# Patient Record
Sex: FEMALE | Race: WHITE | NOT HISPANIC OR LATINO | Employment: OTHER | URBAN - METROPOLITAN AREA
[De-identification: names, ages, dates, MRNs, and addresses within clinical notes are randomized per-mention and may not be internally consistent; named-entity substitution may affect disease eponyms.]

---

## 2017-02-20 RX ORDER — FLUOXETINE HYDROCHLORIDE 20 MG/1
CAPSULE ORAL
Qty: 90 CAPSULE | Refills: 1 | Status: SHIPPED | OUTPATIENT
Start: 2017-02-20 | End: 2017-04-26

## 2017-03-14 ENCOUNTER — OFFICE VISIT (OUTPATIENT)
Dept: INTERNAL MEDICINE | Facility: CLINIC | Age: 67
End: 2017-03-14

## 2017-03-14 VITALS
DIASTOLIC BLOOD PRESSURE: 82 MMHG | OXYGEN SATURATION: 98 % | HEART RATE: 67 BPM | SYSTOLIC BLOOD PRESSURE: 130 MMHG | HEIGHT: 65 IN | WEIGHT: 148 LBS | BODY MASS INDEX: 24.66 KG/M2

## 2017-03-14 DIAGNOSIS — E03.8 OTHER SPECIFIED HYPOTHYROIDISM: ICD-10-CM

## 2017-03-14 DIAGNOSIS — R11.0 NAUSEA: ICD-10-CM

## 2017-03-14 DIAGNOSIS — F39 MOOD DISORDER (HCC): ICD-10-CM

## 2017-03-14 DIAGNOSIS — E78.2 MIXED HYPERLIPIDEMIA: Primary | ICD-10-CM

## 2017-03-14 DIAGNOSIS — R42 DIZZINESS: ICD-10-CM

## 2017-03-14 PROCEDURE — 99214 OFFICE O/P EST MOD 30 MIN: CPT | Performed by: INTERNAL MEDICINE

## 2017-03-14 RX ORDER — ONDANSETRON 4 MG/1
4 TABLET, FILM COATED ORAL EVERY 8 HOURS PRN
Qty: 30 TABLET | Refills: 0 | Status: SHIPPED | OUTPATIENT
Start: 2017-03-14 | End: 2017-07-24

## 2017-03-14 RX ORDER — BUPROPION HYDROCHLORIDE 300 MG/1
300 TABLET ORAL DAILY
Qty: 30 TABLET | Refills: 3 | Status: SHIPPED | OUTPATIENT
Start: 2017-03-14 | End: 2017-04-26 | Stop reason: SINTOL

## 2017-03-14 RX ORDER — ATORVASTATIN CALCIUM 20 MG/1
20 TABLET, FILM COATED ORAL DAILY
COMMUNITY
End: 2017-07-24

## 2017-03-14 NOTE — PROGRESS NOTES
Subjective   Tammy Workman is a 66 y.o. female.     Hyperlipidemia   This is a chronic problem. Associated symptoms include myalgias ( Has reduced  atorvastatin ti 10 MG & myalgias are better). Pertinent negatives include no chest pain.   Hypertension   Pertinent negatives include no chest pain or palpitations.   Depression Patient is not experiencing: palpitations.         The following portions of the patient's history were reviewed and updated as appropriate: allergies, current medications, past family history, past medical history, past social history, past surgical history and problem list.    Review of Systems   Constitutional:        Generally doing well   HENT: Negative.    Eyes: Negative.    Respiratory: Negative.    Cardiovascular: Negative for chest pain and palpitations.   Genitourinary: Negative.    Musculoskeletal: Positive for arthralgias (Hips ache some) and myalgias ( Has reduced  atorvastatin ti 10 MG & myalgias are better).   Neurological: Negative.    Psychiatric/Behavioral: Positive for dysphoric mood (Has been on Prozac for depression Most of her SX are in the AM Daughter takes Wellbutrin & works well ).       Objective   Physical Exam   Constitutional: She is oriented to person, place, and time. She appears well-developed.   HENT:   Head: Normocephalic.   Eyes: EOM are normal.   Neck: Neck supple.   Cardiovascular: Normal rate, regular rhythm and normal heart sounds.    Repeat 132/80   Pulmonary/Chest: Effort normal and breath sounds normal.   Musculoskeletal: Normal range of motion.   Neurological: She is alert and oriented to person, place, and time.       Assessment/Plan   Tammy was seen today for hyperlipidemia, hypertension and depression.    Diagnoses and all orders for this visit:    Mixed hyperlipidemia    Other specified hypothyroidism    Mood disorder  -     buPROPion XL (WELLBUTRIN XL) 300 MG 24 hr tablet; Take 1 tablet by mouth Daily.    Nausea  -     ondansetron (ZOFRAN) 4 MG  tablet; Take 1 tablet by mouth Every 8 (Eight) Hours As Needed for Nausea or Vomiting.    Dizziness  Comments:  will check carotid...? inner ear  Orders:  -     ondansetron (ZOFRAN) 4 MG tablet; Take 1 tablet by mouth Every 8 (Eight) Hours As Needed for Nausea or Vomiting.

## 2017-03-15 DIAGNOSIS — E03.9 HYPOTHYROIDISM IN ADULT: ICD-10-CM

## 2017-03-15 RX ORDER — LEVOTHYROXINE SODIUM 0.05 MG/1
TABLET ORAL
Qty: 90 TABLET | Refills: 0 | Status: SHIPPED | OUTPATIENT
Start: 2017-03-15 | End: 2017-09-01 | Stop reason: SDUPTHER

## 2017-03-27 ENCOUNTER — OFFICE VISIT (OUTPATIENT)
Dept: INTERNAL MEDICINE | Facility: CLINIC | Age: 67
End: 2017-03-27

## 2017-03-27 VITALS
HEIGHT: 65 IN | HEART RATE: 66 BPM | OXYGEN SATURATION: 98 % | DIASTOLIC BLOOD PRESSURE: 86 MMHG | SYSTOLIC BLOOD PRESSURE: 142 MMHG | WEIGHT: 149 LBS | BODY MASS INDEX: 24.83 KG/M2

## 2017-03-27 DIAGNOSIS — L27.0 DRUG RASH: Primary | ICD-10-CM

## 2017-03-27 PROCEDURE — 99213 OFFICE O/P EST LOW 20 MIN: CPT | Performed by: INTERNAL MEDICINE

## 2017-03-27 RX ORDER — METHYLPREDNISOLONE 4 MG/1
TABLET ORAL
Qty: 21 TABLET | Refills: 0 | Status: SHIPPED | OUTPATIENT
Start: 2017-03-27 | End: 2017-04-26

## 2017-03-27 NOTE — PROGRESS NOTES
Subjective   Tammy Workman is a 66 y.o. female.     Rash   This is a new problem. The current episode started in the past 7 days.        The following portions of the patient's history were reviewed and updated as appropriate: allergies, current medications, past family history, past medical history, past social history, past surgical history and problem list.    Review of Systems   Constitutional:        Has rash   Respiratory: Negative.    Cardiovascular: Negative.    Gastrointestinal: Negative.    Skin: Positive for rash ( started breaking out in Pruritic rash over the weekend  Thinks due to Wellbutrin whiich is new & jusrt started 2 weeks age  Has taken Benadryl).       Objective   Physical Exam   Constitutional: She appears well-developed.   Skin: Rash noted. There is erythema ( Has erythematous papular rash  mainly on torso).   Vitals reviewed.      Assessment/Plan   Tammy was seen today for rash.    Diagnoses and all orders for this visit:    Drug rash  -     MethylPREDNISolone (MEDROL) 4 MG tablet; follow package directions

## 2017-04-26 ENCOUNTER — OFFICE VISIT (OUTPATIENT)
Dept: INTERNAL MEDICINE | Facility: CLINIC | Age: 67
End: 2017-04-26

## 2017-04-26 VITALS
DIASTOLIC BLOOD PRESSURE: 96 MMHG | OXYGEN SATURATION: 97 % | BODY MASS INDEX: 24.66 KG/M2 | HEIGHT: 65 IN | WEIGHT: 148 LBS | HEART RATE: 62 BPM | SYSTOLIC BLOOD PRESSURE: 130 MMHG

## 2017-04-26 DIAGNOSIS — E78.2 MIXED HYPERLIPIDEMIA: ICD-10-CM

## 2017-04-26 DIAGNOSIS — F39 MOOD DISORDER (HCC): Primary | ICD-10-CM

## 2017-04-26 DIAGNOSIS — E03.8 OTHER SPECIFIED HYPOTHYROIDISM: ICD-10-CM

## 2017-04-26 DIAGNOSIS — G47.00 INSOMNIA, UNSPECIFIED TYPE: ICD-10-CM

## 2017-04-26 LAB
ALBUMIN SERPL-MCNC: 4.15 G/DL (ref 3.4–4.6)
ALBUMIN/GLOB SERPL: 1.4 G/DL
ALP SERPL-CCNC: 75 U/L (ref 46–116)
ALT SERPL W P-5'-P-CCNC: 28 U/L (ref 14–59)
ANION GAP SERPL CALCULATED.3IONS-SCNC: 10 MMOL/L
AST SERPL-CCNC: 21 U/L (ref 7–37)
BASOPHILS # BLD AUTO: 0.02 10*3/MM3 (ref 0–0.2)
BASOPHILS NFR BLD AUTO: 0.4 % (ref 0–2)
BILIRUB SERPL-MCNC: 0.5 MG/DL (ref 0.2–1)
BUN BLD-MCNC: 14 MG/DL (ref 6–22)
BUN/CREAT SERPL: 13.9 (ref 7–25)
CALCIUM SPEC-SCNC: 9.2 MG/DL (ref 8.6–10.5)
CHLORIDE SERPL-SCNC: 103 MMOL/L (ref 95–107)
CHOLEST SERPL-MCNC: 258 MG/DL (ref 0–200)
CO2 SERPL-SCNC: 28 MMOL/L (ref 23–32)
CREAT BLD-MCNC: 1.01 MG/DL (ref 0.55–1.02)
DEPRECATED RDW RBC AUTO: 43.1 FL (ref 37–54)
EOSINOPHIL # BLD AUTO: 0.1 10*3/MM3 (ref 0–0.7)
EOSINOPHIL NFR BLD AUTO: 2.1 % (ref 0–5)
ERYTHROCYTE [DISTWIDTH] IN BLOOD BY AUTOMATED COUNT: 14 % (ref 11.5–15)
GFR SERPL CREATININE-BSD FRML MDRD: 55 ML/MIN/1.73
GLOBULIN UR ELPH-MCNC: 3.1 GM/DL
GLUCOSE BLD-MCNC: 95 MG/DL (ref 70–100)
HCT VFR BLD AUTO: 42.9 % (ref 34.1–44.9)
HDLC SERPL-MCNC: 65 MG/DL (ref 40–81)
HGB BLD-MCNC: 14.6 G/DL (ref 11.2–15.7)
LDLC SERPL CALC-MCNC: 175 MG/DL (ref 0–100)
LDLC/HDLC SERPL: 2.7 {RATIO}
LYMPHOCYTES # BLD AUTO: 1.9 10*3/MM3 (ref 0.8–7)
LYMPHOCYTES NFR BLD AUTO: 40.5 % (ref 10–60)
MCH RBC QN AUTO: 29.4 PG (ref 26–34)
MCHC RBC AUTO-ENTMCNC: 34 G/DL (ref 31–37)
MCV RBC AUTO: 86.5 FL (ref 80–100)
MONOCYTES # BLD AUTO: 0.44 10*3/MM3 (ref 0–1)
MONOCYTES NFR BLD AUTO: 9.4 % (ref 0–13)
NEUTROPHILS # BLD AUTO: 2.23 10*3/MM3 (ref 1–11)
NEUTROPHILS NFR BLD AUTO: 47.6 % (ref 30–85)
PLATELET # BLD AUTO: 356 10*3/MM3 (ref 150–450)
PMV BLD AUTO: 9.8 FL (ref 6–12)
POTASSIUM BLD-SCNC: 4.7 MMOL/L (ref 3.3–5.3)
PROT SERPL-MCNC: 7.2 G/DL (ref 6.3–8.4)
RBC # BLD AUTO: 4.96 10*6/MM3 (ref 3.93–5.22)
SODIUM BLD-SCNC: 141 MMOL/L (ref 136–145)
T4 FREE SERPL-MCNC: 1.12 NG/DL (ref 0.93–1.7)
TRIGL SERPL-MCNC: 88 MG/DL (ref 0–150)
TSH SERPL DL<=0.05 MIU/L-ACNC: 1.33 MIU/ML (ref 0.4–4.2)
VLDLC SERPL-MCNC: 17.6 MG/DL
WBC NRBC COR # BLD: 4.69 10*3/MM3 (ref 5–10)

## 2017-04-26 PROCEDURE — 85025 COMPLETE CBC W/AUTO DIFF WBC: CPT | Performed by: INTERNAL MEDICINE

## 2017-04-26 PROCEDURE — 80053 COMPREHEN METABOLIC PANEL: CPT | Performed by: INTERNAL MEDICINE

## 2017-04-26 PROCEDURE — 80061 LIPID PANEL: CPT | Performed by: INTERNAL MEDICINE

## 2017-04-26 PROCEDURE — 84443 ASSAY THYROID STIM HORMONE: CPT | Performed by: INTERNAL MEDICINE

## 2017-04-26 PROCEDURE — 99214 OFFICE O/P EST MOD 30 MIN: CPT | Performed by: INTERNAL MEDICINE

## 2017-04-26 RX ORDER — TRAZODONE HYDROCHLORIDE 50 MG/1
50 TABLET ORAL AS NEEDED
Qty: 30 TABLET | Refills: 1 | Status: SHIPPED | OUTPATIENT
Start: 2017-04-26 | End: 2017-07-24

## 2017-04-26 NOTE — PROGRESS NOTES
Subjective   Tammy Workman is a 66 y.o. female.     Hyperlipidemia   This is a chronic problem.   Depression   Visit Type: follow-up         The following portions of the patient's history were reviewed and updated as appropriate: allergies, current medications, past family history, past medical history, past social history, past surgical history and problem list.    Review of Systems   Constitutional:        Generally doing well    HENT: Negative.    Eyes: Negative.    Respiratory: Negative.    Cardiovascular: Negative.    Gastrointestinal: Negative.    Endocrine:        Couldn't tolerate Livalo                Went back on small dose of lipitor & did better on it   Genitourinary: Negative.    Musculoskeletal: Negative.    Neurological: Negative.    Psychiatric/Behavioral: Positive for dysphoric mood (Has been off prozac & wellbutrin for a month & feels OK Would like to try remaining off anything for now OK W/ us).       Objective   Physical Exam   Constitutional: She appears well-developed.   HENT:   Head: Normocephalic.   Eyes: EOM are normal.   Neck: Neck supple.   Cardiovascular: Normal rate, regular rhythm and normal heart sounds.    Repeat 150/90   Pulmonary/Chest: Effort normal and breath sounds normal.   Musculoskeletal: Normal range of motion.   Neurological: She is alert.   Vitals reviewed.      Assessment/Plan   Tammy was seen today for hyperlipidemia and depression.    Diagnoses and all orders for this visit:    Mood disorder    Mixed hyperlipidemia  -     CBC Auto Differential; Future  -     Comprehensive Metabolic Panel; Future  -     Lipid Panel; Future    Other specified hypothyroidism  -     TSH; Future  -     T4, Free; Future    Insomnia, unspecified type  -     traZODone (DESYREL) 50 MG tablet; Take 1 tablet by mouth As Needed for Sleep.

## 2017-07-24 ENCOUNTER — OFFICE VISIT (OUTPATIENT)
Dept: INTERNAL MEDICINE | Facility: CLINIC | Age: 67
End: 2017-07-24

## 2017-07-24 VITALS
DIASTOLIC BLOOD PRESSURE: 80 MMHG | TEMPERATURE: 98 F | SYSTOLIC BLOOD PRESSURE: 134 MMHG | WEIGHT: 140 LBS | OXYGEN SATURATION: 97 % | BODY MASS INDEX: 23.3 KG/M2 | HEART RATE: 70 BPM

## 2017-07-24 DIAGNOSIS — E03.9 HYPOTHYROIDISM IN ADULT: ICD-10-CM

## 2017-07-24 DIAGNOSIS — R42 DIZZINESS OF UNKNOWN CAUSE: Primary | ICD-10-CM

## 2017-07-24 DIAGNOSIS — H43.391 FLOATERS, RIGHT: ICD-10-CM

## 2017-07-24 LAB — TSH SERPL-ACNC: 3.41 MIU/ML (ref 0.27–4.2)

## 2017-07-24 PROCEDURE — 99214 OFFICE O/P EST MOD 30 MIN: CPT | Performed by: NURSE PRACTITIONER

## 2017-07-24 RX ORDER — MECLIZINE HYDROCHLORIDE 25 MG/1
25 TABLET ORAL 3 TIMES DAILY PRN
Qty: 30 TABLET | Refills: 0 | Status: SHIPPED | OUTPATIENT
Start: 2017-07-24 | End: 2019-04-12 | Stop reason: SDUPTHER

## 2017-07-24 NOTE — PROGRESS NOTES
Wes Munoz Workman is a 67 y.o. female.     HPI Comments: She has a history of inner ear dizziness. She started having symptoms yesterday.     Dizziness   This is a chronic problem. The current episode started yesterday. The problem occurs intermittently. The problem has been gradually improving. Associated symptoms include nausea and a visual change (floater in right eye for few weeks). Pertinent negatives include no change in bowel habit, chest pain, chills, coughing, fever, headaches, numbness, urinary symptoms, vomiting or weakness. Exacerbated by: position  Treatments tried: benadryl  The treatment provided mild relief.        The following portions of the patient's history were reviewed and updated as appropriate: allergies, current medications and problem list.    Review of Systems   Constitutional: Negative for chills and fever.   Eyes: Positive for visual disturbance (floater in right eye, intermittent ). Negative for photophobia, pain, discharge, redness and itching.   Respiratory: Negative for cough.    Cardiovascular: Negative for chest pain, palpitations and leg swelling.   Gastrointestinal: Positive for nausea. Negative for change in bowel habit and vomiting.   Endocrine: Positive for heat intolerance (hot flashes , chronic ).   Genitourinary: Negative for difficulty urinating, dysuria, frequency and urgency.   Neurological: Positive for dizziness and light-headedness. Negative for speech difficulty, weakness, numbness and headaches.       Objective   Physical Exam   Constitutional: She is oriented to person, place, and time. She appears well-developed and well-nourished. She is cooperative. She does not have a sickly appearance. She does not appear ill.   HENT:   Head: Normocephalic.   Right Ear: Hearing and external ear normal. No drainage, swelling or tenderness. No mastoid tenderness. Tympanic membrane is bulging. Tympanic membrane is not injected, not scarred and not erythematous. Tympanic  membrane mobility is normal. No middle ear effusion. No decreased hearing is noted.   Left Ear: Hearing and external ear normal. No drainage, swelling or tenderness. No mastoid tenderness. Tympanic membrane is bulging. Tympanic membrane is not injected, not scarred and not erythematous. Tympanic membrane mobility is normal.  No middle ear effusion. No decreased hearing is noted.   Nose: Nose normal. No mucosal edema, rhinorrhea, sinus tenderness or nasal deformity. Right sinus exhibits no maxillary sinus tenderness and no frontal sinus tenderness. Left sinus exhibits no maxillary sinus tenderness and no frontal sinus tenderness.   Mouth/Throat: Uvula is midline, oropharynx is clear and moist and mucous membranes are normal. Normal dentition. No tonsillar exudate.   Eyes: Conjunctivae, EOM and lids are normal. Pupils are equal, round, and reactive to light. Right eye exhibits no discharge and no exudate. Left eye exhibits no discharge and no exudate.   Neck: Trachea normal and normal range of motion. Carotid bruit is not present. No edema present. No thyroid mass and no thyromegaly present.   Cardiovascular: Regular rhythm, normal heart sounds and normal pulses.  Exam reveals no S3 and no S4.    No murmur heard.  Orthostatic readings:    Lyin/80, HR 87  Sittin/85, HR 85  Standin/76, HR 99    No pedal edema    Pulmonary/Chest: Effort normal and breath sounds normal. No respiratory distress. She has no decreased breath sounds. She has no wheezes. She has no rhonchi. She has no rales.   Musculoskeletal: She exhibits no edema.   Lymphadenopathy:        Head (right side): No submental, no submandibular, no tonsillar, no preauricular, no posterior auricular and no occipital adenopathy present.        Head (left side): No submental, no submandibular, no tonsillar, no preauricular, no posterior auricular and no occipital adenopathy present.     She has no cervical adenopathy.   Neurological: She is alert  and oriented to person, place, and time. Gait normal.   Skin: Skin is warm, dry and intact. No cyanosis. Nails show no clubbing.   Psychiatric: She has a normal mood and affect.       Assessment/Plan   Tammy was seen today for dizziness.    Diagnoses and all orders for this visit:    Dizziness of unknown cause  Comments:  she will start benadryl   Orders:  -     meclizine (ANTIVERT) 25 MG tablet; Take 1 tablet by mouth 3 (Three) Times a Day As Needed for dizziness or Nausea.    Hypothyroidism in adult  -     TSH; Future  -     TSH    Floaters, right  Comments:  patient instructed to make appt with opthalamologist     Her daughter is accompanying her.

## 2017-07-24 NOTE — PATIENT INSTRUCTIONS
Dizziness  Dizziness is a common problem. It is a feeling of unsteadiness or light-headedness. You may feel like you are about to faint. Dizziness can lead to injury if you stumble or fall. Anyone can become dizzy, but dizziness is more common in older adults. This condition can be caused by a number of things, including medicines, dehydration, or illness.  HOME CARE INSTRUCTIONS  Taking these steps may help with your condition:  Eating and Drinking  · Drink enough fluid to keep your urine clear or pale yellow. This helps to keep you from becoming dehydrated. Try to drink more clear fluids, such as water.  · Do not drink alcohol.  · Limit your caffeine intake if directed by your health care provider.  · Limit your salt intake if directed by your health care provider.  Activity  · Avoid making quick movements.    Rise slowly from chairs and steady yourself until you feel okay.    In the morning, first sit up on the side of the bed. When you feel okay, stand slowly while you hold onto something until you know that your balance is fine.  · Move your legs often if you need to  one place for a long time. Tighten and relax your muscles in your legs while you are standing.  · Do not drive or operate heavy machinery if you feel dizzy.  · Avoid bending down if you feel dizzy. Place items in your home so that they are easy for you to reach without leaning over.  Lifestyle  · Do not use any tobacco products, including cigarettes, chewing tobacco, or electronic cigarettes. If you need help quitting, ask your health care provider.  · Try to reduce your stress level, such as with yoga or meditation. Talk with your health care provider if you need help.  General Instructions  · Watch your dizziness for any changes.  · Take medicines only as directed by your health care provider. Talk with your health care provider if you think that your dizziness is caused by a medicine that you are taking.  · Tell a friend or a family  member that you are feeling dizzy. If he or she notices any changes in your behavior, have this person call your health care provider.  · Keep all follow-up visits as directed by your health care provider. This is important.  SEEK MEDICAL CARE IF:  · Your dizziness does not go away.  · Your dizziness or light-headedness gets worse.  · You feel nauseous.  · You have reduced hearing.  · You have new symptoms.  · You are unsteady on your feet or you feel like the room is spinning.  SEEK IMMEDIATE MEDICAL CARE IF:  · You vomit or have diarrhea and are unable to eat or drink anything.  · You have problems talking, walking, swallowing, or using your arms, hands, or legs.  · You feel generally weak.  · You are not thinking clearly or you have trouble forming sentences. It may take a friend or family member to notice this.  · You have chest pain, abdominal pain, shortness of breath, or sweating.  · Your vision changes.  · You notice any bleeding.  · You have a headache.  · You have neck pain or a stiff neck.  · You have a fever.     This information is not intended to replace advice given to you by your health care provider. Make sure you discuss any questions you have with your health care provider.     Document Released: 06/13/2002 Document Revised: 05/03/2016 Document Reviewed: 12/14/2015  [employer] Interactive Patient Education ©2017 [employer]

## 2017-08-01 ENCOUNTER — OFFICE VISIT (OUTPATIENT)
Dept: INTERNAL MEDICINE | Facility: CLINIC | Age: 67
End: 2017-08-01

## 2017-08-01 VITALS
DIASTOLIC BLOOD PRESSURE: 88 MMHG | WEIGHT: 143 LBS | HEIGHT: 65 IN | SYSTOLIC BLOOD PRESSURE: 122 MMHG | BODY MASS INDEX: 23.82 KG/M2

## 2017-08-01 DIAGNOSIS — I10 ESSENTIAL HYPERTENSION: Primary | ICD-10-CM

## 2017-08-01 DIAGNOSIS — F51.01 PRIMARY INSOMNIA: ICD-10-CM

## 2017-08-01 PROCEDURE — 99213 OFFICE O/P EST LOW 20 MIN: CPT | Performed by: INTERNAL MEDICINE

## 2017-08-01 RX ORDER — TRIAMTERENE AND HYDROCHLOROTHIAZIDE 37.5; 25 MG/1; MG/1
1 TABLET ORAL DAILY
Qty: 30 TABLET | Refills: 3 | Status: SHIPPED | OUTPATIENT
Start: 2017-08-01 | End: 2017-12-14 | Stop reason: SDUPTHER

## 2017-08-01 NOTE — PROGRESS NOTES
Subjective   Tammy Workman is a 67 y.o. female.     Insomnia   This is a chronic problem. Pertinent negatives include no chest pain.        The following portions of the patient's history were reviewed and updated as appropriate: allergies, current medications, past family history, past medical history, past surgical history and problem list.    Review of Systems   Constitutional:        Here for F/U doing well   HENT: Negative.    Eyes: Negative.    Respiratory: Negative.    Cardiovascular: Negative.  Negative for chest pain and palpitations.        BP has been bouncing around     Gastrointestinal: Negative.    Neurological: Positive for dizziness (Is bettewr).   Psychiatric/Behavioral: Positive for sleep disturbance ( Couldn't toleratetrazodone). The patient has insomnia.        Objective   Physical Exam   Constitutional: She appears well-developed.   HENT:   Head: Normocephalic.   Eyes: EOM are normal.   Neck: Neck supple.   Cardiovascular: Normal rate, regular rhythm and normal heart sounds.    Repeat 140/90   Pulmonary/Chest: Effort normal and breath sounds normal.   Musculoskeletal: Normal range of motion.   Neurological: She is alert.   Skin: Skin is warm.   Vitals reviewed.      Assessment/Plan   Tammy was seen today for insomnia.    Diagnoses and all orders for this visit:    Essential hypertension  -     triamterene-hydrochlorothiazide (MAXZIDE-25) 37.5-25 MG per tablet; Take 1 tablet by mouth Daily.    Primary insomnia    Discussed insomnia issues with patient.  She has been off Ambien for quite some period of time does not want restarted this point in time.  She states she does get some benefit from Benadryl at nighttime.

## 2017-09-01 ENCOUNTER — OFFICE VISIT (OUTPATIENT)
Dept: INTERNAL MEDICINE | Facility: CLINIC | Age: 67
End: 2017-09-01

## 2017-09-01 VITALS
HEIGHT: 65 IN | DIASTOLIC BLOOD PRESSURE: 82 MMHG | SYSTOLIC BLOOD PRESSURE: 138 MMHG | BODY MASS INDEX: 23.66 KG/M2 | WEIGHT: 142 LBS

## 2017-09-01 DIAGNOSIS — E03.9 HYPOTHYROIDISM IN ADULT: ICD-10-CM

## 2017-09-01 DIAGNOSIS — E03.8 OTHER SPECIFIED HYPOTHYROIDISM: ICD-10-CM

## 2017-09-01 DIAGNOSIS — I10 ESSENTIAL HYPERTENSION: Primary | ICD-10-CM

## 2017-09-01 PROCEDURE — 99213 OFFICE O/P EST LOW 20 MIN: CPT | Performed by: INTERNAL MEDICINE

## 2017-09-01 RX ORDER — LEVOTHYROXINE SODIUM 0.05 MG/1
50 TABLET ORAL DAILY
Qty: 90 TABLET | Refills: 1 | Status: SHIPPED | OUTPATIENT
Start: 2017-09-01 | End: 2018-02-21 | Stop reason: SDUPTHER

## 2017-09-01 NOTE — PROGRESS NOTES
Subjective   Tammy Workman is a 67 y.o. female.     Hypertension   This is a chronic problem. The current episode started more than 1 month ago. Pertinent negatives include no chest pain or palpitations.   Hypothyroidism   This is a chronic problem. Pertinent negatives include no chest pain.        The following portions of the patient's history were reviewed and updated as appropriate: allergies, current medications, past family history, past medical history, past social history, past surgical history and problem list.    Review of Systems   Constitutional:        Here for BP F/U    HENT: Negative.    Eyes: Negative.    Cardiovascular: Negative for chest pain and palpitations.        BP checks @ home have been good & tolerating RX well   Gastrointestinal: Negative.    Genitourinary: Negative.    Musculoskeletal: Negative.        Objective   Physical Exam   Constitutional: She is oriented to person, place, and time. She appears well-developed.   HENT:   Head: Normocephalic.   Eyes: EOM are normal.   Neck: Neck supple.   Cardiovascular: Normal rate, regular rhythm and normal heart sounds.    Repeat 130/80   Pulmonary/Chest: Effort normal and breath sounds normal.   Musculoskeletal: Normal range of motion.   Neurological: She is alert and oriented to person, place, and time.   Vitals reviewed.      Assessment/Plan   Tammy was seen today for hypertension, mood disorder and hypothyroidism.    Diagnoses and all orders for this visit:    Essential hypertension    Other specified hypothyroidism

## 2017-12-08 ENCOUNTER — OFFICE VISIT (OUTPATIENT)
Dept: INTERNAL MEDICINE | Facility: CLINIC | Age: 67
End: 2017-12-08

## 2017-12-08 ENCOUNTER — APPOINTMENT (OUTPATIENT)
Dept: WOMENS IMAGING | Facility: HOSPITAL | Age: 67
End: 2017-12-08

## 2017-12-08 VITALS
BODY MASS INDEX: 24.32 KG/M2 | SYSTOLIC BLOOD PRESSURE: 100 MMHG | HEIGHT: 65 IN | OXYGEN SATURATION: 97 % | HEART RATE: 74 BPM | DIASTOLIC BLOOD PRESSURE: 82 MMHG | WEIGHT: 146 LBS

## 2017-12-08 DIAGNOSIS — I10 ESSENTIAL HYPERTENSION: Primary | ICD-10-CM

## 2017-12-08 DIAGNOSIS — E03.8 OTHER SPECIFIED HYPOTHYROIDISM: ICD-10-CM

## 2017-12-08 DIAGNOSIS — Z00.00 MEDICARE ANNUAL WELLNESS VISIT, INITIAL: ICD-10-CM

## 2017-12-08 DIAGNOSIS — M70.72 BURSITIS OF OTHER BURSA OF LEFT HIP: ICD-10-CM

## 2017-12-08 LAB
ALBUMIN SERPL-MCNC: 4.7 G/DL (ref 3.5–5.2)
ALBUMIN/GLOB SERPL: 1.7 G/DL
ALP SERPL-CCNC: 82 U/L (ref 39–117)
ALT SERPL-CCNC: 16 U/L (ref 1–33)
AST SERPL-CCNC: 17 U/L (ref 1–32)
BASOPHILS # BLD AUTO: 0.06 10*3/MM3 (ref 0–0.2)
BASOPHILS NFR BLD AUTO: 1.3 % (ref 0–2)
BILIRUB SERPL-MCNC: 0.4 MG/DL (ref 0.1–1.2)
BUN SERPL-MCNC: 15 MG/DL (ref 8–23)
BUN/CREAT SERPL: 16.7 (ref 7–25)
CALCIUM SERPL-MCNC: 10.1 MG/DL (ref 8.6–10.5)
CHLORIDE SERPL-SCNC: 101 MMOL/L (ref 98–107)
CHOLEST SERPL-MCNC: 245 MG/DL (ref 0–200)
CO2 SERPL-SCNC: 29 MMOL/L (ref 22–29)
CREAT SERPL-MCNC: 0.9 MG/DL (ref 0.57–1)
DEPRECATED RDW RBC AUTO: 43.7 FL (ref 37–54)
EOSINOPHIL # BLD AUTO: 0.13 10*3/MM3 (ref 0–0.7)
EOSINOPHIL NFR BLD AUTO: 2.9 % (ref 0–5)
ERYTHROCYTE [DISTWIDTH] IN BLOOD BY AUTOMATED COUNT: 14.2 % (ref 11.5–15)
GLOBULIN SER CALC-MCNC: 2.8 GM/DL
GLUCOSE SERPL-MCNC: 88 MG/DL (ref 65–99)
HCT VFR BLD AUTO: 41.6 % (ref 34.1–44.9)
HDLC SERPL-MCNC: 72 MG/DL (ref 40–60)
HGB BLD-MCNC: 14.5 G/DL (ref 11.2–15.7)
LDLC SERPL CALC-MCNC: 155 MG/DL (ref 0–100)
LYMPHOCYTES # BLD AUTO: 1.81 10*3/MM3 (ref 0.8–7)
LYMPHOCYTES NFR BLD AUTO: 40.3 % (ref 10–60)
MCH RBC QN AUTO: 29.7 PG (ref 26–34)
MCHC RBC AUTO-ENTMCNC: 34.9 G/DL (ref 31–37)
MCV RBC AUTO: 85.1 FL (ref 80–100)
MONOCYTES # BLD AUTO: 0.43 10*3/MM3 (ref 0–1)
MONOCYTES NFR BLD AUTO: 9.6 % (ref 0–13)
NEUTROPHILS # BLD AUTO: 2.06 10*3/MM3 (ref 1–11)
NEUTROPHILS NFR BLD AUTO: 45.9 % (ref 30–85)
PLATELET # BLD AUTO: 346 10*3/MM3 (ref 150–450)
PMV BLD AUTO: 9.7 FL (ref 6–12)
POTASSIUM SERPL-SCNC: 4.4 MMOL/L (ref 3.5–5.2)
PROT SERPL-MCNC: 7.5 G/DL (ref 6–8.5)
RBC # BLD AUTO: 4.89 10*6/MM3 (ref 3.93–5.22)
SODIUM SERPL-SCNC: 141 MMOL/L (ref 136–145)
T4 FREE SERPL-MCNC: 1.34 NG/DL (ref 0.93–1.7)
TRIGL SERPL-MCNC: 91 MG/DL (ref 0–150)
TSH SERPL-ACNC: 2.47 MIU/ML (ref 0.27–4.2)
VLDLC SERPL-MCNC: 18.2 MG/DL (ref 5–40)
WBC NRBC COR # BLD: 4.49 10*3/MM3 (ref 5–10)

## 2017-12-08 PROCEDURE — G0438 PPPS, INITIAL VISIT: HCPCS | Performed by: INTERNAL MEDICINE

## 2017-12-08 PROCEDURE — 85025 COMPLETE CBC W/AUTO DIFF WBC: CPT | Performed by: INTERNAL MEDICINE

## 2017-12-08 PROCEDURE — 93000 ELECTROCARDIOGRAM COMPLETE: CPT | Performed by: INTERNAL MEDICINE

## 2017-12-08 PROCEDURE — 71020 XR CHEST 2 VW: CPT | Performed by: INTERNAL MEDICINE

## 2017-12-08 PROCEDURE — 71020 CHG CHEST X-RAY 2 VW: CPT | Performed by: RADIOLOGY

## 2017-12-08 PROCEDURE — 99214 OFFICE O/P EST MOD 30 MIN: CPT | Performed by: INTERNAL MEDICINE

## 2017-12-08 RX ORDER — MELOXICAM 15 MG/1
15 TABLET ORAL DAILY
Qty: 30 TABLET | Refills: 3 | Status: SHIPPED | OUTPATIENT
Start: 2017-12-08 | End: 2018-04-04 | Stop reason: SDUPTHER

## 2017-12-08 NOTE — PROGRESS NOTES
Wes Munoz Workman is a 67 y.o. female.     Hip Pain    The incident occurred more than 1 week ago.   Hypertension   This is a chronic problem. The current episode started more than 1 year ago. Pertinent negatives include no chest pain or palpitations.        The following portions of the patient's history were reviewed and updated as appropriate: allergies, current medications, past family history, past medical history, past social history, past surgical history and problem list.    Review of Systems   Constitutional:        Generally doing well   HENT: Negative.    Eyes: Positive for visual disturbance (Reading glasses).   Respiratory: Negative.    Cardiovascular: Negative for chest pain and palpitations.        Checks BP & usually good   Gastrointestinal: Negative.    Genitourinary: Positive for menstrual problem (Sees Padmini Hamilton in [place] ).   Musculoskeletal: Positive for arthralgias (Having more pain in Rt hip & buttock area Has been going on for some time Ibuprofen not helping).   Neurological: Negative.    Psychiatric/Behavioral: Negative.        Objective   Physical Exam   Constitutional: She is oriented to person, place, and time. She appears well-developed and well-nourished.   HENT:   Head: Normocephalic.   Right Ear: External ear normal.   Left Ear: External ear normal.   Nose: Nose normal.   Mouth/Throat: Oropharynx is clear and moist.   Eyes: Conjunctivae and EOM are normal. Pupils are equal, round, and reactive to light.   Neck: Normal range of motion. Neck supple. No thyromegaly present.   Cardiovascular: Normal rate, regular rhythm, normal heart sounds and intact distal pulses.    Repeat 120/70   Pulmonary/Chest: Effort normal and breath sounds normal.   Abdominal: Soft. Bowel sounds are normal.   Genitourinary:   Genitourinary Comments: Did not examine   Musculoskeletal: Normal range of motion. She exhibits tenderness (Mild tenderness to palpation L gluteal area).   Neurological:  She is alert and oriented to person, place, and time. She has normal reflexes.   Skin: Skin is warm and dry.   Psychiatric: She has a normal mood and affect. Her behavior is normal.   Vitals reviewed.      Assessment/Plan   Tammy was seen today for awv, hip pain and hypertension.    Diagnoses and all orders for this visit:    Essential hypertension  -     CBC Auto Differential; Future  -     Comprehensive Metabolic Panel; Future  -     Lipid Panel; Future  -     XR Chest 2 View  -     ECG 12 Lead  -     CBC Auto Differential  -     Comprehensive Metabolic Panel  -     Lipid Panel    Medicare annual wellness visit, initial    Other specified hypothyroidism  -     TSH; Future  -     T4, Free; Future  -     TSH  -     T4, Free    Bursitis of other bursa of left hip  -     meloxicam (MOBIC) 15 MG tablet; Take 1 tablet by mouth Daily.  -     Ambulatory Referral to Physical Therapy        ECG 12 Lead  Date/Time: 12/8/2017 9:09 AM  Performed by: ALEXANDRE LOUIS  Authorized by: ALEXANDRE LOUIS   Rhythm: sinus rhythm  Conduction: conduction normal  ST Segments: ST segments normal  T Waves: T waves normal  Other: no other findings  Clinical impression: normal ECG  Comments: No priors

## 2017-12-08 NOTE — PROGRESS NOTES
QUICK REFERENCE INFORMATION:  The ABCs of the Annual Wellness Visit    [facility] to Medicare Visit    HEALTH RISK ASSESSMENT    1950    Recent Hospitalizations:  No hospitalization(s) within the last year..      Current Medical Providers:  Patient Care Team:  Cal Dey MD as PCP - General (Internal Medicine)      Smoking Status:  History   Smoking Status   • Never Smoker   Smokeless Tobacco   • Never Used       Alcohol Consumption:  History   Alcohol Use   • Yes     Comment: Occcasional       Depression Screen:   PHQ-2/PHQ-9 Depression Screening 12/8/2017   Little interest or pleasure in doing things 1   Feeling down, depressed, or hopeless 0   Trouble falling or staying asleep, or sleeping too much 1   Feeling tired or having little energy 1   Poor appetite or overeating 0   Feeling bad about yourself - or that you are a failure or have let yourself or your family down 0   Trouble concentrating on things, such as reading the newspaper or watching television 0   Moving or speaking so slowly that other people could have noticed. Or the opposite - being so fidgety or restless that you have been moving around a lot more than usual 0   Thoughts that you would be better off dead, or of hurting yourself in some way 0   Total Score 3       Health Habits and Functional and Cognitive Screening:  Functional & Cognitive Status 12/8/2017   Do you have difficulty preparing food and eating? No   Do you have difficulty bathing yourself, getting dressed or grooming yourself? No   Do you have difficulty using the toilet? No   Do you have difficulty moving around from place to place? No   Do you have trouble with steps or getting out of a bed or a chair? No   In the past year have you fallen or experienced a near fall? No   Current Diet Well Balanced Diet   Dental Exam Up to date   Eye Exam Not up to date   Exercise (times per week) 2 times per week   Current Exercise Activities Include Yoga   Do you need help using the  phone?  No   Are you deaf or do you have serious difficulty hearing?  No   Do you need help shopping? No   Do you need help preparing meals?  No   Do you need help with housework?  No   Do you need help with laundry? No   Do you need help taking your medications? No   Do you need help managing money? No   Have you felt unusual stress, anger or loneliness in the last month? No   Who do you live with? Spouse   If you need help, do you have trouble finding someone available to you? No   Have you been bothered in the last four weeks by sexual problems? No   Do you have difficulty concentrating, remembering or making decisions? No           Does the patient have evidence of cognitive impairment? No    Aspirin use counseling? Does not need ASA (and currently is not on it)      Recent Lab Results:  CMP:  Lab Results   Component Value Date    BUN 14 04/26/2017    CREATININE 1.01 04/26/2017    EGFRIFNONA 55 (L) 04/26/2017    BCR 13.9 04/26/2017     04/26/2017    K 4.7 04/26/2017    CO2 28.0 04/26/2017    CALCIUM 9.2 04/26/2017    ALBUMIN 4.15 04/26/2017    LABIL2 1.4 04/26/2017    BILITOT 0.5 04/26/2017    ALKPHOS 75 04/26/2017    AST 21 04/26/2017    ALT 28 04/26/2017     Lipid Panel:  Lab Results   Component Value Date    CHOL 258 (H) 04/26/2017    TRIG 88 04/26/2017    HDL 65 04/26/2017    VLDL 17.6 04/26/2017    LDLCALC 175 (H) 04/26/2017    LDLHDL 2.70 04/26/2017     HbA1c:       Visual Acuity:  No exam data present    Age-appropriate Screening Schedule:  Refer to the list below for future screening recommendations based on patient's age, sex and/or medical conditions. Orders for these recommended tests are listed in the plan section. The patient has been provided with a written plan.    Health Maintenance   Topic Date Due   • TDAP/TD VACCINES (1 - Tdap) 07/05/1969   • PNEUMOCOCCAL VACCINES (65+ LOW/MEDIUM RISK) (1 of 2 - PCV13) 07/05/2015   • MAMMOGRAM  03/18/2016   • COLONOSCOPY  03/18/2016   • ZOSTER VACCINE   "03/18/2016   • DXA SCAN  01/16/2017   • INFLUENZA VACCINE  08/01/2017   • LIPID PANEL  04/26/2018        Subjective   History of Present Illness    Tammy Torres is a 67 y.o. female an established patient presenting for a Welcome to Medicare Visit.     The following portions of the patient's history were reviewed and updated as appropriate: allergies, current medications, past family history, past medical history, past social history, past surgical history and problem list.    Outpatient Medications Prior to Visit   Medication Sig Dispense Refill   • cholecalciferol (VITAMIN D3) 1000 UNITS tablet Take 1,000 Units by mouth daily.     • levothyroxine (SYNTHROID, LEVOTHROID) 50 MCG tablet Take 1 tablet by mouth Daily. 90 tablet 1   • meclizine (ANTIVERT) 25 MG tablet Take 1 tablet by mouth 3 (Three) Times a Day As Needed for dizziness or Nausea. 30 tablet 0   • Omega-3 Fatty Acids (FISH OIL) 1000 MG capsule capsule Take 1,000 mg by mouth daily with breakfast.     • triamterene-hydrochlorothiazide (MAXZIDE-25) 37.5-25 MG per tablet Take 1 tablet by mouth Daily. 30 tablet 3     No facility-administered medications prior to visit.        Patient Active Problem List   Diagnosis   • Depression       Advance Care Planning:  has NO advance directive - information provided to the patient today    Identification of Risk Factors:  Risk factors include: inactivity.    Review of Systems    Compared to one year ago, the patient feels her physical health is the same.  Compared to one year ago, the patient feels her mental health is the same.    Objective    Physical Exam    Vitals:    12/08/17 0829   BP: 100/82   BP Location: Left arm   Pulse: 74   SpO2: 97%   Weight: 66.2 kg (146 lb)   Height: 165.1 cm (65\")   PainSc:   4   PainLoc: Hip       Body mass index is 24.3 kg/(m^2).  Discussed the patient's BMI with her. BMI is within normal parameters. No follow-up required.    Procedure   Procedures       Assessment/Plan   Patient " Self-Management and Personalized Health Advice  The patient has been provided with information about: exercise and preventive services including:   · Advance directive.    Visit Diagnoses:  No diagnosis found.    No orders of the defined types were placed in this encounter.      Outpatient Encounter Prescriptions as of 12/8/2017   Medication Sig Dispense Refill   • cholecalciferol (VITAMIN D3) 1000 UNITS tablet Take 1,000 Units by mouth daily.     • levothyroxine (SYNTHROID, LEVOTHROID) 50 MCG tablet Take 1 tablet by mouth Daily. 90 tablet 1   • meclizine (ANTIVERT) 25 MG tablet Take 1 tablet by mouth 3 (Three) Times a Day As Needed for dizziness or Nausea. 30 tablet 0   • Omega-3 Fatty Acids (FISH OIL) 1000 MG capsule capsule Take 1,000 mg by mouth daily with breakfast.     • triamterene-hydrochlorothiazide (MAXZIDE-25) 37.5-25 MG per tablet Take 1 tablet by mouth Daily. 30 tablet 3     No facility-administered encounter medications on file as of 12/8/2017.        Reviewed use of high risk medication in the elderly: not applicable  Reviewed for potential of harmful drug interactions in the elderly: not applicable    Follow Up:  No Follow-up on file.     An After Visit Summary and PPPS with all of these plans were given to the patient.

## 2017-12-14 DIAGNOSIS — I10 ESSENTIAL HYPERTENSION: ICD-10-CM

## 2017-12-14 RX ORDER — TRIAMTERENE AND HYDROCHLOROTHIAZIDE 37.5; 25 MG/1; MG/1
TABLET ORAL
Qty: 30 TABLET | Refills: 4 | Status: SHIPPED | OUTPATIENT
Start: 2017-12-14 | End: 2018-06-27 | Stop reason: SDUPTHER

## 2017-12-22 ENCOUNTER — HOSPITAL ENCOUNTER (OUTPATIENT)
Dept: PHYSICAL THERAPY | Facility: HOSPITAL | Age: 67
Setting detail: THERAPIES SERIES
Discharge: HOME | End: 2017-12-22

## 2017-12-22 DIAGNOSIS — M70.72 BURSITIS OF LEFT HIP, UNSPECIFIED BURSA: Primary | ICD-10-CM

## 2017-12-22 PROCEDURE — 97161 PT EVAL LOW COMPLEX 20 MIN: CPT | Performed by: PHYSICAL THERAPIST

## 2017-12-22 PROCEDURE — G8978 MOBILITY CURRENT STATUS: HCPCS

## 2017-12-22 PROCEDURE — G8979 MOBILITY GOAL STATUS: HCPCS

## 2017-12-22 PROCEDURE — 97035 APP MDLTY 1+ULTRASOUND EA 15: CPT | Performed by: PHYSICAL THERAPIST

## 2017-12-22 PROCEDURE — 97110 THERAPEUTIC EXERCISES: CPT | Performed by: PHYSICAL THERAPIST

## 2017-12-22 NOTE — THERAPY EVALUATION
Outpatient Physical Therapy Ortho Initial Evaluation  [facility]     Patient Name: Tammy Torres  : 1950  MRN: [id number]  Today's Date: 2017      Visit Date: 2017    Patient Active Problem List   Diagnosis   • Depression        Past Medical History:   Diagnosis Date   • Depression    • Hyperlipidemia    • Hypothyroidism    • Insomnia    • Mood disorder    • Osteopenia         Past Surgical History:   Procedure Laterality Date   • ANAL FISTULOTOMY     • COLONOSCOPY     • MAMMO BILATERAL     • PAP SMEAR      Normal       Visit Dx:     ICD-10-CM ICD-9-CM   1. Bursitis of left hip, unspecified bursa M70.72 726.5             Patient History       17 1100          History    Chief Complaint Pain  -      Type of Pain Hip pain  -      Date Current Problem(s) Began --   2017  -      Brief Description of Current Complaint Patient is a 68 y/o female presenting with left hip pain due to bursitis. Patient reports the pain has been chronic due to OA over the years, however back in September she began having more discomfort with sleeping. She also began having more pain climbing stairs and relied on her right leg to go up/down. She went to see her PCP on  who put her on an anti-inflammatory and it has reduced her pain to the point where she can tolerate stairs again. She still reports staying at an average pain level of 2-3/10 but the pain was at a 5-6/10 going up steps prior to starting her medication.   -      Previous treatment for THIS PROBLEM Medication  -      Onset Date- PT 17  -      Patient/Caregiver Goals Relieve pain;Improve mobility;Improve strength  -      Occupation/sports/leisure activities used to go on long walks of 4-5 miles, now it's too uncomfortable  -      Patient seeing anyone else for problem(s)? Yes  -      How has patient tried to help current problem? anti-inflammatories  -      Pain     Pain Location Hip  -       Pain at Present 2  -KH      Pain at Best 2  -KH      Pain at Worst 4  -KH      What Performance Factors Make the Current Problem(s) WORSE? stairs, sleeping on the left side  -KH      What Performance Factors Make the Current Problem(s) BETTER? anti-inflammatories  -KH      Tolerance Time- Walking not walking for leisure anymore  -KH      Difficulties at work? retired  -KH      Difficulties with ADL's? no concerns  -KH      Difficulties with recreational activities? unable to go on 3-4 mile walks anymore and some pain when bending down to garden  -KH      Fall Risk Assessment    Any falls in the past year: No  -KH      Daily Activities    Primary Language English  -KH      Are you able to read Yes  -KH      Are you able to write Yes  -KH      Patient is concerned about/has problems with Walking;Climbing Stairs  -KH      Pt Participated in POC and Goals Yes  -KH      Safety    Are you being hurt, hit, or frightened by anyone at home or in your life? No  -KH      Are you being neglected by a caregiver No  -KH        User Key  (r) = Recorded By, (t) = Taken By, (c) = Cosigned By    Initials Name Provider Type     Hawa Deshpande, CAMILO Physical Therapist                PT Ortho       12/22/17 1100    Posture/Observations    Posture/Observations Comments no pain with palpation of musculature surrounding L hip: IT band, glute med/min, and piriformis  -    Sensory Screen for Light Touch- Lower Quarter Clearing    L1 (inguinal area) Bilateral:;Intact  -KH    L2 (anterior mid thigh) Bilateral:;Intact  -KH    L3 (distal anterior thigh) Bilateral:;Intact  -KH    L4 (medial lower leg/foot) Bilateral:;Intact  -KH    L5 (lateral lower leg/great toe) Bilateral:;Intact  -KH    S1 (bottom of foot) Bilateral:;Intact  -KH    Left Hip    Flexion ROM Details 120 degrees  -KH    ABduction ROM Details 35 degrees  -KH    Internal Rotation ROM Details 50 degrees  -KH    External Rotation ROM Details 35 degrees  -KH    Right Hip    Flexion  ROM Details 120 degrees  -KH    ABduction ROM Details 35 degrees  -KH    Internal Rotation ROM Details 50 degrees  -KH    External Rotation ROM Details 35 degrees  -KH    Left Hip    Hip Flexion Gross Movement (4+/5) good plus  -KH    Hip Extension Gross Movement (4/5) good  -KH    Hip ABduction Gross Movement (4+/5) good plus  -KH    Hip Int (Medial) Rotation Gross Movement (4+/5) good plus  -KH    Hip Ext (Lat) Rotation Gross Movement (4+/5) good plus  -KH    Right Hip    Hip Flexion Gross Movement (4+/5) good plus  -KH    Hip Extension Gross Movement (4/5) good  -KH    Hip ABduction Gross Movement (4/5) good  -KH    Hip Int (Medial) Rotation Gross Movement (4+/5) good plus  -KH    Hip Ext (Lat) Rotation Gross Movement (4+/5) good plus  -KH    Lower Extremity Flexibility    Overall LE Flexibility WNL  -KH    Hamstrings Bilateral:;WNL  -KH    Hip Flexors Bilateral:;WNL  -KH    Hip External Rotators Bilateral:;WNL  -KH      User Key  (r) = Recorded By, (t) = Taken By, (c) = Cosigned By    Initials Name Provider Type    MIGUELINA Deshpande, PT Physical Therapist              PT OP Goals       12/22/17 1100       PT Short Term Goals    STG 1 Patient will demonstrate safety and independence with initial HEP to reduce pain and manage symptoms  -     STG 2 Patient will report 2/10 or less pain with stair climbing to increase ease with mobility  -     STG 3 Patient will increase B hip extension strength from 4/5 to 4+/5 to improve hip stability/strength for walking and stair climbing  -     Long Term Goals    LTG 1 Patient will report reintegration of her 4-5 mile walking routine in order to promote good physical fitness  -     LTG 2 Patient will report minimal to no pain with stair climbing to increase ease with mobility  -     LTG 3 Patient will reduce level of perceived disability as measured by the LEFS from 19% disability to </=10% disability in order to improve quality of life  -       User Key  (r) =  Recorded By, (t) = Taken By, (c) = Cosigned By    Initials Name Provider Type    MIGUELINA Deshpande, PT Physical Therapist                PT Assessment/Plan       12/22/17 9748       PT Assessment    Functional Limitations Limitations in community activities;Limitations in functional capacity and performance;Performance in leisure activities  -     Impairments Pain;Gait  -KH     Assessment Comments Patient is a 68 y/o female presenting with left hip pain due to bursitis. Patient reports the pain has been chronic due to OA over the years, however back in September she began having more discomfort with sleeping. She also began having more pain climbing stairs and relied on her right leg to go up/down. She went to see her PCP on December 8th who put her on an anti-inflammatory and it has reduced her pain to the point where she can tolerate stairs again. She still reports staying at an average pain level of 2-3/10 but the pain was at a 5-6/10 going up steps prior to starting her medication. Upon physical examination patient presents with slight weakness with B hip extension, but overall demonstrates good hip AROM, flexibility, and strength. She had no tenderness with palpation of the musculature surrounding the L hip and reported that her pain reduced from a 2/10 to a 0/10 following one US treatment. Patient's anti-inflammatories are likely helping to mask her pain, therefore patient would still benefit from skilled PT to reduce pain and allow patient to return to daily 4 mile walks.   -MIGUELINA     Please refer to paper survey for additional self-reported information Yes  -KH     Rehab Potential Good  -KH     Patient/caregiver participated in establishment of treatment plan and goals Yes  -KH     Patient would benefit from skilled therapy intervention Yes  -KH     PT Plan    PT Frequency 2x/week  -KH     Predicted Duration of Therapy Intervention (days/wks) 4-5 weeks  -KH     Planned CPT's? PT EVAL LOW COMPLEXITY: 94040;PT  MANUAL THERAPY EA 15 MIN: 23890;PT THER PROC EA 15 MIN: 20937;PT RE-EVAL: 27940;PT GAIT TRAINING EA 15 MIN: 46844;PT ELECTRICAL STIM UNATTEND: ;PT ELECTRICAL STIM ATTD EA 15 MIN: 72364;PT HOT OR COLD PACK TREAT MCARE;PT NEUROMUSC RE-EDUCATION EA 15 MIN: 84060;PT SELF CARE/HOME MGMT/TRAIN EA 15: 78007;PT ULTRASOUND EA 15 MIN: 49144;PT THER ACT EA 15 MIN: 52910  -     Physical Therapy Interventions (Optional Details) stair training;strengthening;stretching;ROM (Range of Motion);taping;dry needling;gait training;gross motor skills;home exercise program;postural re-education;patient/family education;modalities  -     PT Plan Comments modalaties PRN for pain, manual STM/dry needling, and gentle hip stretching/strengthening  -       User Key  (r) = Recorded By, (t) = Taken By, (c) = Cosigned By    Initials Name Provider Type    MIGUELINA Deshpande PT Physical Therapist                Modalities       12/22/17 1100          Ultrasound [id number]    Location left piriformis muscle  -KH      Rx Minutes 10  -KH      Duty Cycle 50  -KH      Frequency 1.0 MHz  -KH      Intensity - Wts/cm 1.5  -KH      Phonophoresis No  -        User Key  (r) = Recorded By, (t) = Taken By, (c) = Cosigned By    Initials Name Provider Type    MIGUELINA Deshpande PT Physical Therapist              Exercises       12/22/17 1100          Subjective Comments    Subjective Comments My pain is better but isn't 100% gone  -      Subjective Pain    Able to rate subjective pain? yes  -KH      Pre-Treatment Pain Level 2  -KH      Post-Treatment Pain Level 0  -KH      Exercise 1    Exercise Name 1 ITB stretch  -      Reps 1 2  -KH      Time (Seconds) 1 30  -KH      Exercise 2    Exercise Name 2 sidelying hip abduction  -      Reps 2 15  -KH      Exercise 3    Exercise Name 3 bridges  -KH      Reps 3 15  -KH        User Key  (r) = Recorded By, (t) = Taken By, (c) = Cosigned By    Initials Name Provider Type    MIGUELINA Deshpande PT Physical Therapist               Outcome Measure Options: Lower Extremity Functional Scale (LEFS) (19% disability (65/80))         Time Calculation:   Start Time: 1100  Stop Time: 1145  Time Calculation (min): 45 min     Therapy Charges for Today     Code Description Service Date Service Provider Modifiers Qty    69008876458 HC PT EVAL LOW COMPLEXITY 1 12/22/2017 Hawa Deshpande, PT GP 1    80296379162 HC PT THER PROC EA 15 MIN 12/22/2017 Hawa Deshpande, PT GP 1    [id number]  PT ULTRASOUND EA 15 MIN 12/22/2017 Hawa Deshpande, PT GP 1          PT G-Codes  Outcome Measure Options: Lower Extremity Functional Scale (LEFS) (19% disability (65/80))         Hawa Deshpande PT  12/22/2017

## 2017-12-26 ENCOUNTER — HOSPITAL ENCOUNTER (OUTPATIENT)
Dept: PHYSICAL THERAPY | Facility: HOSPITAL | Age: 67
Setting detail: THERAPIES SERIES
Discharge: HOME | End: 2017-12-26

## 2017-12-26 DIAGNOSIS — M70.72 BURSITIS OF LEFT HIP, UNSPECIFIED BURSA: Primary | ICD-10-CM

## 2017-12-26 PROCEDURE — 97035 APP MDLTY 1+ULTRASOUND EA 15: CPT | Performed by: PHYSICAL THERAPIST

## 2017-12-26 PROCEDURE — 97110 THERAPEUTIC EXERCISES: CPT | Performed by: PHYSICAL THERAPIST

## 2017-12-26 NOTE — THERAPY TREATMENT NOTE
Outpatient Physical Therapy Ortho Treatment Note  [facility]     Patient Name: Tammy Torres  : 1950  MRN: [id number]  Today's Date: 2017      Visit Date: 2017    Visit Dx:    ICD-10-CM ICD-9-CM   1. Bursitis of left hip, unspecified bursa M70.72 726.5       Patient Active Problem List   Diagnosis   • Depression        Past Medical History:   Diagnosis Date   • Depression    • Hyperlipidemia    • Hypothyroidism    • Insomnia    • Mood disorder    • Osteopenia         Past Surgical History:   Procedure Laterality Date   • ANAL FISTULOTOMY     • COLONOSCOPY     • MAMMO BILATERAL     • PAP SMEAR      Normal                             PT Assessment/Plan       17 1630       PT Assessment    Assessment Comments Patient noted increased pain after initial session so modified/changed ex and will assess response next session.  She demonstrates decreased hip strength/stabilization with noted compensation L vs R with SL WB. Repeat of US to pirifromis    -RA     PT Plan    PT Plan Comments Continue skilled therapy including hip stretching, core/hip strength/stabilizationpain, and manual (possible DDN)/modalities prn  -RA       User Key  (r) = Recorded By, (t) = Taken By, (c) = Cosigned By    Initials Name Provider Type    KIMBERLY Green, PT Physical Therapist                Modalities       17 1500          Ultrasound [id number]    Location left piriformis muscle  -RA      Rx Minutes 10  -RA      Duty Cycle 50  -RA      Frequency 1.0 MHz  -RA      Intensity - Wts/cm 1.5  -RA        User Key  (r) = Recorded By, (t) = Taken By, (c) = Cosigned By    Initials Name Provider Type    KIMBERLY Green, PT Physical Therapist                Exercises       17 1500          Subjective Comments    Subjective Comments Felt more pain/discomfort later that day after I was here last.    -RA      Subjective Pain    Able to rate subjective pain? yes  -RA      Pre-Treatment Pain Level 3  "  2-3/10  -RA      Exercise 1    Exercise Name 1 ITB crossover stretch supine with strap  -RA      Reps 1 2  -RA      Time (Seconds) 1 30  -RA      Exercise 2    Exercise Name 2 sidelying hip abduction  -RA      Reps 2 --  -RA      Additional Comments omitted today  -RA      Exercise 3    Exercise Name 3 bridges  -RA      Reps 3 15  -RA      Additional Comments attempted with hip add but increased pain so discontinued  -RA      Exercise 4    Exercise Name 4 HL hip abd w/ TB B and uni   -RA      Reps 4 10   ea  -RA      Time (Seconds) 4 3  -RA      Additional Comments green - instructed to use red at home if increased symptoms after today's session  -RA      Exercise 5    Exercise Name 5 standing hip abd B   -RA      Reps 5 10  -RA      Additional Comments attention to glut activation, posture to avoid leaning  -RA      Exercise 6    Exercise Name 6 4\" step ups   -RA      Reps 6 10  -RA      Additional Comments noted increased hip sway when performing on L   -RA      Exercise 7    Exercise Name 7 piriformis stretch   -RA      Reps 7 3  -RA      Time (Seconds) 7 20  -RA        User Key  (r) = Recorded By, (t) = Taken By, (c) = Cosigned By    Initials Name Provider Type    RA Elizabeth Green, PT Physical Therapist                             Therapy Education  Education Details: Discussed being more aware of equal WB with standing posture and glut activation with SL WB,  use of tennis ball for self massage/trigger point release, and provided verbal information on what dry needling is (couldn't find information handout),   Given: HEP, Symptoms/condition management, Posture/body mechanics  Program: Modified  How Provided: Verbal, Demonstration, Written  Provided to: Patient  Level of Understanding: Teach back education performed, Verbalized              Time Calculation:   Start Time: 1502  Stop Time: 1550  Time Calculation (min): 48 min    Therapy Charges for Today     Code Description Service Date Service Provider " Modifiers Qty    84865617176 HC PT ULTRASOUND EA 15 MIN 12/26/2017 Elizabeth Green, PT GP 1    49025269729 HC PT THER PROC EA 15 MIN 12/26/2017 Elizabeth Green, PT GP 2                    Elizabeth Green, PT  12/26/2017

## 2018-01-09 ENCOUNTER — HOSPITAL ENCOUNTER (OUTPATIENT)
Dept: PHYSICAL THERAPY | Facility: HOSPITAL | Age: 68
Setting detail: THERAPIES SERIES
Discharge: HOME | End: 2018-01-09

## 2018-01-09 DIAGNOSIS — M70.72 BURSITIS OF LEFT HIP, UNSPECIFIED BURSA: Primary | ICD-10-CM

## 2018-01-09 PROCEDURE — 97110 THERAPEUTIC EXERCISES: CPT | Performed by: PHYSICAL THERAPIST

## 2018-01-09 PROCEDURE — 97140 MANUAL THERAPY 1/> REGIONS: CPT | Performed by: PHYSICAL THERAPIST

## 2018-01-09 NOTE — THERAPY TREATMENT NOTE
Outpatient Physical Therapy Ortho Treatment Note  [facility]     Patient Name: Tammy Torres  : 1950  MRN: [id number]  Today's Date: 2018      Visit Date: 2018    Visit Dx:    ICD-10-CM ICD-9-CM   1. Bursitis of left hip, unspecified bursa M70.72 726.5       Patient Active Problem List   Diagnosis   • Depression        Past Medical History:   Diagnosis Date   • Depression    • Hyperlipidemia    • Hypothyroidism    • Insomnia    • Mood disorder    • Osteopenia         Past Surgical History:   Procedure Laterality Date   • ANAL FISTULOTOMY     • COLONOSCOPY     • MAMMO BILATERAL     • PAP SMEAR      Normal             PT Ortho       18 1100    Subjective Comments    Subjective Comments pt had a death in the family and has been busy with that. She is still on the anti-inflammatories  -    Subjective Pain    Able to rate subjective pain? yes  -    Pre-Treatment Pain Level 3  -    Post-Treatment Pain Level 2  -      User Key  (r) = Recorded By, (t) = Taken By, (c) = Cosigned By    Initials Name Provider Type     Tabitha Erickson, PT Physical Therapist                            PT Assessment/Plan       18 1319       PT Assessment    Assessment Comments Pt now back to therapy after having to take care of some family issues. She has still been taking her anti-inflammatories and they are helping her pain. She hasn't been as consistent over the past week with her HEP and is now able to get back to it. She has tenderness over the L gluteals, piriformis and glute med, and along the sacral border. STM did help to releive some of her pain today.   -     PT Plan    PT Plan Comments cont with skilled therapy for hip pain reduction, strength, ROM, and flexiblity  -       User Key  (r) = Recorded By, (t) = Taken By, (c) = Cosigned By    Initials Name Provider Type    PETTY Erickson PT Physical Therapist                    Exercises       18 1100           "Subjective Comments    Subjective Comments pt had a death in the family and has been busy with that. She is still on the anti-inflammatories  -LH      Subjective Pain    Able to rate subjective pain? yes  -LH      Pre-Treatment Pain Level 3  -LH      Post-Treatment Pain Level 2  -LH      Exercise 1    Exercise Name 1 ITB crossover stretch supine with strap  -      Reps 1 2  -LH      Time (Seconds) 1 30  -LH      Exercise 2    Exercise Name 2 sidelying hip abduction  -LH      Sets 2 2  -LH      Reps 2 10  -LH      Exercise 3    Exercise Name 3 bridges  -LH      Reps 3 15  -LH      Exercise 4    Exercise Name 4 HL hip abd w/ TB B and uni   -LH      Sets 4 2  -LH      Reps 4 10   ea  -LH      Time (Seconds) 4 3  -LH      Additional Comments green  -LH      Exercise 5    Exercise Name 5 standing hip abd B   -LH      Reps 5 --  -LH      Exercise 6    Exercise Name 6 4\" step ups   -LH      Reps 6 --  -LH      Exercise 7    Exercise Name 7 piriformis stretch   -      Reps 7 3  -LH      Time (Seconds) 7 20  -LH        User Key  (r) = Recorded By, (t) = Taken By, (c) = Cosigned By    Initials Name Provider Type     Tabitha Erickson PT Physical Therapist                        Manual Rx (last 36 hours)      Manual Treatments       01/09/18 1300          Manual Rx 1    Manual Rx 1 Location L gluteals  -      Manual Rx 1 Type STM in R SL with SI joint and ITBand release  -      Manual Rx 1 Duration 15  -LH        User Key  (r) = Recorded By, (t) = Taken By, (c) = Cosigned By    Initials Name Provider Type     Tabitha Erickson PT Physical Therapist              Therapy Education  Given: HEP, Symptoms/condition management, Posture/body mechanics  Program: Reinforced  How Provided: Verbal  Provided to: Patient  Level of Understanding: Teach back education performed              Time Calculation:   Start Time: 1100  Stop Time: 1345  Time Calculation (min): 165 min    Therapy Charges for Today     Code Description " Service Date Service Provider Modifiers Qty    28158577140  PT THER PROC EA 15 MIN 1/9/2018 Tabitha Erickson, PT GP 2    54719925617 HC PT MANUAL THERAPY EA 15 MIN 1/9/2018 Tabitha Erickson, PT GP 1                    Tabitha Erickson, PT  1/9/2018

## 2018-01-11 ENCOUNTER — HOSPITAL ENCOUNTER (OUTPATIENT)
Dept: PHYSICAL THERAPY | Facility: HOSPITAL | Age: 68
Setting detail: THERAPIES SERIES
Discharge: HOME | End: 2018-01-11

## 2018-01-11 ENCOUNTER — OFFICE VISIT (OUTPATIENT)
Dept: INTERNAL MEDICINE | Facility: CLINIC | Age: 68
End: 2018-01-11

## 2018-01-11 VITALS
SYSTOLIC BLOOD PRESSURE: 122 MMHG | DIASTOLIC BLOOD PRESSURE: 84 MMHG | HEART RATE: 72 BPM | BODY MASS INDEX: 25.33 KG/M2 | OXYGEN SATURATION: 93 % | WEIGHT: 152 LBS | HEIGHT: 65 IN

## 2018-01-11 DIAGNOSIS — Z23 IMMUNIZATION DUE: ICD-10-CM

## 2018-01-11 DIAGNOSIS — M70.72 BURSITIS OF LEFT HIP, UNSPECIFIED BURSA: Primary | ICD-10-CM

## 2018-01-11 PROCEDURE — 97140 MANUAL THERAPY 1/> REGIONS: CPT | Performed by: PHYSICAL THERAPIST

## 2018-01-11 PROCEDURE — 90686 IIV4 VACC NO PRSV 0.5 ML IM: CPT | Performed by: INTERNAL MEDICINE

## 2018-01-11 PROCEDURE — 99213 OFFICE O/P EST LOW 20 MIN: CPT | Performed by: INTERNAL MEDICINE

## 2018-01-11 PROCEDURE — G0008 ADMIN INFLUENZA VIRUS VAC: HCPCS | Performed by: INTERNAL MEDICINE

## 2018-01-11 PROCEDURE — 97110 THERAPEUTIC EXERCISES: CPT | Performed by: PHYSICAL THERAPIST

## 2018-01-11 NOTE — THERAPY TREATMENT NOTE
Outpatient Physical Therapy Ortho Treatment Note  [facility]     Patient Name: Tammy Torres  : 1950  MRN: [id number]  Today's Date: 2018      Visit Date: 2018    Visit Dx:    ICD-10-CM ICD-9-CM   1. Bursitis of left hip, unspecified bursa M70.72 726.5       Patient Active Problem List   Diagnosis   • Depression        Past Medical History:   Diagnosis Date   • Depression    • Hyperlipidemia    • Hypothyroidism    • Insomnia    • Mood disorder    • Osteopenia         Past Surgical History:   Procedure Laterality Date   • ANAL FISTULOTOMY     • COLONOSCOPY     • MAMMO BILATERAL     • PAP SMEAR      Normal             PT Ortho       18 0800    Subjective Comments    Subjective Comments I am usually more sore in the am, but didn't have to take any Tylenol.   -    Subjective Pain    Able to rate subjective pain? yes  -    Pre-Treatment Pain Level 2  -    Post-Treatment Pain Level 1  -      18 1100    Subjective Comments    Subjective Comments pt had a death in the family and has been busy with that. She is still on the anti-inflammatories  -    Subjective Pain    Able to rate subjective pain? yes  -    Pre-Treatment Pain Level 3  -    Post-Treatment Pain Level 2  -      User Key  (r) = Recorded By, (t) = Taken By, (c) = Cosigned By    Initials Name Provider Type     Tabitha Erickson, PT Physical Therapist                            PT Assessment/Plan       18 0844       PT Assessment    Assessment Comments Pt has had some imprvement in her hip pain over the past couple of days. She rates it more of a soreness than pain. She has mild tenderness and tightness of the piriformis and gluteals today. Added lateral step ups without incresaed pain  -     PT Plan    PT Frequency 2x/week  -     PT Plan Comments cont  -       User Key  (r) = Recorded By, (t) = Taken By, (c) = Cosigned By    Initials Name Provider Type    PETTY Erickson PT Physical  "Therapist                    Exercises       01/11/18 0800          Subjective Comments    Subjective Comments I am usually more sore in the am, but didn't have to take any Tylenol.   -LH      Subjective Pain    Able to rate subjective pain? yes  -LH      Pre-Treatment Pain Level 2  -LH      Post-Treatment Pain Level 1  -LH      Exercise 1    Exercise Name 1 ITB crossover stretch supine with strap  -LH      Reps 1 2  -LH      Time (Seconds) 1 30  -LH      Exercise 2    Exercise Name 2 sidelying hip abduction  -LH      Sets 2 2  -LH      Reps 2 10  -LH      Exercise 3    Exercise Name 3 bridges  -LH      Reps 3 20  -LH      Exercise 4    Exercise Name 4 HL hip abd w/ TB B and uni   -LH      Sets 4 2  -LH      Reps 4 10   ea  -LH      Time (Seconds) 4 3  -LH      Additional Comments green  -LH      Exercise 5    Exercise Name 5 standing hip abd B   -LH      Reps 5 --  -LH      Exercise 6    Exercise Name 6 4\" step ups   -LH      Sets 6 2  -LH      Reps 6 10  -LH      Additional Comments fwd and lat  -LH      Exercise 7    Exercise Name 7 piriformis stretch   -LH      Reps 7 3  -LH      Time (Seconds) 7 20  -LH        User Key  (r) = Recorded By, (t) = Taken By, (c) = Cosigned By    Initials Name Provider Type     Tabitha Erickson PT Physical Therapist                        Manual Rx (last 36 hours)      Manual Treatments       01/11/18 0700          Manual Rx 1    Manual Rx 1 Location L gluteals  -      Manual Rx 1 Type STM in R SL with SI joint and ITBand release  -LH      Manual Rx 1 Duration 15  -LH        User Key  (r) = Recorded By, (t) = Taken By, (c) = Cosigned By    Initials Name Provider Type     Tabitha Erickson PT Physical Therapist              Therapy Education  Given: HEP, Symptoms/condition management  Program: Reinforced  How Provided: Verbal  Provided to: Patient  Level of Understanding: Teach back education performed              Time Calculation:   Start Time: 0800  Stop Time: 0845  Time " Calculation (min): 45 min    Therapy Charges for Today     Code Description Service Date Service Provider Modifiers Qty    12997439855  PT THER PROC EA 15 MIN 1/11/2018 Tabitha Erickson, PT GP 2    69109176276  PT MANUAL THERAPY EA 15 MIN 1/11/2018 Tabitha Erickson, PT GP 1                    Tabitha Erickson, PT  1/11/2018

## 2018-01-11 NOTE — PROGRESS NOTES
Subjective   Tammy Workman is a 67 y.o. female.     Hip Pain    The incident occurred more than 1 week ago.        The following portions of the patient's history were reviewed and updated as appropriate: allergies, current medications, past family history, past medical history, past social history, past surgical history and problem list.    Review of Systems   Constitutional:        Here for F/U generally doing well   HENT: Negative.    Respiratory: Negative.    Cardiovascular: Negative.    Gastrointestinal: Negative.    Genitourinary: Negative.    Musculoskeletal:        Pain in L hip & buttock is doing better but still present Taking Meloxicam & getting PT        Objective   Physical Exam   Constitutional: She appears well-developed.   HENT:   Head: Normocephalic.   Eyes: EOM are normal.   Neck: Neck supple.   Cardiovascular: Normal rate, regular rhythm and normal heart sounds.    Pulmonary/Chest: Effort normal.   Musculoskeletal: She exhibits tenderness (Mild tenderness L gluteal area SLR NEG bilat Good ROM of both hips).   Vitals reviewed.      Assessment/Plan   Tammy was seen today for hip pain.    Diagnoses and all orders for this visit:    Bursitis of left hip, unspecified bursa    Immunization due  -     Flu Vaccine Quad PF 3YR+ (FLUARIX/FLUZONE [id number])

## 2018-01-15 ENCOUNTER — HOSPITAL ENCOUNTER (OUTPATIENT)
Dept: PHYSICAL THERAPY | Facility: HOSPITAL | Age: 68
Setting detail: THERAPIES SERIES
Discharge: HOME | End: 2018-01-15

## 2018-01-15 DIAGNOSIS — M70.72 BURSITIS OF LEFT HIP, UNSPECIFIED BURSA: Primary | ICD-10-CM

## 2018-01-15 PROCEDURE — 97110 THERAPEUTIC EXERCISES: CPT | Performed by: PHYSICAL THERAPIST

## 2018-01-15 PROCEDURE — 97140 MANUAL THERAPY 1/> REGIONS: CPT | Performed by: PHYSICAL THERAPIST

## 2018-01-15 NOTE — THERAPY TREATMENT NOTE
Outpatient Physical Therapy Ortho Treatment Note  [facility]     Patient Name: Tammy Torres  : 1950  MRN: [id number]  Today's Date: 1/15/2018      Visit Date: 01/15/2018    Visit Dx:    ICD-10-CM ICD-9-CM   1. Bursitis of left hip, unspecified bursa M70.72 726.5       Patient Active Problem List   Diagnosis   • Depression        Past Medical History:   Diagnosis Date   • Depression    • Hyperlipidemia    • Hypothyroidism    • Insomnia    • Mood disorder    • Osteopenia         Past Surgical History:   Procedure Laterality Date   • ANAL FISTULOTOMY     • COLONOSCOPY     • MAMMO BILATERAL     • PAP SMEAR      Normal                             PT Assessment/Plan       01/15/18 1136       PT Assessment    Assessment Comments Pt pain levels overall improved. She has been able to sleep better and is having less discomfort in the am from her pain. She just has mild tenderness and tightness over the L glutealsl today. Able to increase step ups to 6 in without increased pain  -     PT Plan    PT Frequency 2x/week  -     PT Plan Comments cont to see for strength, ROM, flexiblity, and pain control  -       User Key  (r) = Recorded By, (t) = Taken By, (c) = Cosigned By    Initials Name Provider Type     Tabitha Erickson, PT Physical Therapist                    Exercises       01/15/18 1000          Subjective Comments    Subjective Comments Pain has been a little better this week  -      Subjective Pain    Able to rate subjective pain? yes  -      Pre-Treatment Pain Level 2  -      Post-Treatment Pain Level 1  -      Exercise 1    Exercise Name 1 ITB crossover stretch supine with strap  -      Reps 1 2  -      Time (Seconds) 1 30  -LH      Exercise 2    Exercise Name 2 sidelying hip abduction  -      Sets 2 2  -LH      Reps 2 10  -LH      Exercise 3    Exercise Name 3 bridges  -      Reps 3 20  -LH      Exercise 4    Exercise Name 4 HL hip abd w/ TB B and uni   -LH      Sets  "4 2  -LH      Reps 4 10   ea  -LH      Time (Seconds) 4 3  -LH      Exercise 5    Exercise Name 5 standing hip abd B   -LH      Exercise 6    Exercise Name 6 6\" step ups   -LH      Sets 6 2  -LH      Reps 6 10  -LH      Additional Comments fwd and lat  -LH      Exercise 7    Exercise Name 7 piriformis stretch   -      Reps 7 3  -LH      Time (Seconds) 7 20  -LH        User Key  (r) = Recorded By, (t) = Taken By, (c) = Cosigned By    Initials Name Provider Type     Tabitha Erickson, PT Physical Therapist                        Manual Rx (last 36 hours)      Manual Treatments       01/15/18 1100          Manual Rx 1    Manual Rx 1 Location L gluteals  -      Manual Rx 1 Type STM in R SL with SI joint and ITBand release  -      Manual Rx 1 Duration 15  -LH        User Key  (r) = Recorded By, (t) = Taken By, (c) = Cosigned By    Initials Name Provider Type     Tabitha Erickson PT Physical Therapist                             Time Calculation:   Start Time: 1015  Stop Time: 1100  Time Calculation (min): 45 min    Therapy Charges for Today     Code Description Service Date Service Provider Modifiers Qty    27567579771  PT THER PROC EA 15 MIN 1/15/2018 Tabitha Erickson, PT GP 2    86671971952 HC PT MANUAL THERAPY EA 15 MIN 1/15/2018 Tabitha Erickson, PT GP 1                    Tabitha Erickson, PT  1/15/2018     "

## 2018-01-30 ENCOUNTER — HOSPITAL ENCOUNTER (OUTPATIENT)
Dept: PHYSICAL THERAPY | Facility: HOSPITAL | Age: 68
Setting detail: THERAPIES SERIES
Discharge: HOME | End: 2018-01-30

## 2018-01-30 DIAGNOSIS — M70.72 BURSITIS OF LEFT HIP, UNSPECIFIED BURSA: Primary | ICD-10-CM

## 2018-01-30 PROCEDURE — G8979 MOBILITY GOAL STATUS: HCPCS | Performed by: PHYSICAL THERAPIST

## 2018-01-30 PROCEDURE — G8978 MOBILITY CURRENT STATUS: HCPCS | Performed by: PHYSICAL THERAPIST

## 2018-01-30 PROCEDURE — 97140 MANUAL THERAPY 1/> REGIONS: CPT | Performed by: PHYSICAL THERAPIST

## 2018-01-30 PROCEDURE — 97110 THERAPEUTIC EXERCISES: CPT | Performed by: PHYSICAL THERAPIST

## 2018-01-30 NOTE — THERAPY PROGRESS REPORT/RE-CERT
Outpatient Physical Therapy Ortho Re-Assessment  [facility]     Patient Name: Tammy Torres  : 1950  MRN: [id number]  Today's Date: 2018      Visit Date: 2018    Patient Active Problem List   Diagnosis   • Depression        Past Medical History:   Diagnosis Date   • Depression    • Hyperlipidemia    • Hypothyroidism    • Insomnia    • Mood disorder    • Osteopenia         Past Surgical History:   Procedure Laterality Date   • ANAL FISTULOTOMY     • COLONOSCOPY     • MAMMO BILATERAL     • PAP SMEAR      Normal       Visit Dx:     ICD-10-CM ICD-9-CM   1. Bursitis of left hip, unspecified bursa M70.72 726.5                 PT Ortho       18 0800    Subjective Comments    Subjective Comments pt had the flu last week.   -    Subjective Pain    Able to rate subjective pain? yes  -    Pre-Treatment Pain Level 3  -    Left Hip    Hip Extension Gross Movement (4/5) good;(4+/5) good plus  -    Hip ABduction Gross Movement (4/5) good;(4+/5) good plus  -    Hip Ext (Lat) Rotation Gross Movement (4+/5) good plus  -      User Key  (r) = Recorded By, (t) = Taken By, (c) = Cosigned By    Initials Name Provider Type     Tabitha Erickson, PT Physical Therapist                      Therapy Education  Given: HEP, Symptoms/condition management  Program: Reinforced  How Provided: Verbal  Provided to: Patient  Level of Understanding: Teach back education performed           PT OP Goals       18 0900       PT Short Term Goals    STG 1 Patient will demonstrate safety and independence with initial HEP to reduce pain and manage symptoms  -     STG 1 Progress Partially Met  -     STG 2 Patient will report 2/10 or less pain with stair climbing to increase ease with mobility  -     STG 2 Progress Ongoing  -     STG 3 Patient will increase B hip extension strength from 4/5 to 4+/5 to improve hip stability/strength for walking and stair climbing  -     STG 3 Progress  Partially Met;Ongoing  -     STG 3 Progress Comments strength 4 to 4+/5  -     Long Term Goals    LTG 1 Patient will report reintegration of her 4-5 mile walking routine in order to promote good physical fitness  -     LTG 1 Progress Ongoing  -     LTG 1 Progress Comments pt has had the flu and not been able to walk  -     LTG 2 Patient will report minimal to no pain with stair climbing to increase ease with mobility  -     LTG 2 Progress Partially Met  -     LTG 2 Progress Comments has some days with no pain ascending stairs  -     LTG 3 Patient will reduce level of perceived disability as measured by the LEFS from 19% disability to </=10% disability in order to improve quality of life  -     LTG 3 Progress Ongoing  -       User Key  (r) = Recorded By, (t) = Taken By, (c) = Cosigned By    Initials Name Provider Type     Tabitha Erickson, PT Physical Therapist                PT Assessment/Plan       01/30/18 0926       PT Assessment    Assessment Comments Pt pain levels are about 3/10 on avg over the psat week. She has  been ill and unable to get back into walking at this time. She has improved her strength and is reporting greater ease at times climbing the stairs and is not having to perform step to pattern all the time.   -     Rehab Potential Good  -     PT Plan    PT Frequency 2x/week  -     PT Plan Comments cont with skilled therapy for ROM, strength, and flexiblity to improve function  -       User Key  (r) = Recorded By, (t) = Taken By, (c) = Cosigned By    Initials Name Provider Type     Tabitha Erickson, PT Physical Therapist                  Exercises       01/30/18 0800          Subjective Comments    Subjective Comments pt had the flu last week.   -      Subjective Pain    Able to rate subjective pain? yes  -      Pre-Treatment Pain Level 3  -      Exercise 1    Exercise Name 1 ITB crossover stretch supine with strap  -      Reps 1 2  -      Time (Seconds) 1 30   "-LH      Exercise 2    Exercise Name 2 sidelying hip abduction  -LH      Sets 2 2  -LH      Reps 2 10  -LH      Exercise 3    Exercise Name 3 bridges  -LH      Reps 3 20  -LH      Exercise 4    Exercise Name 4 HL hip abd w/ TB B and uni   -LH      Sets 4 2  -LH      Reps 4 10   ea  -LH      Additional Comments blue  -LH      Exercise 5    Exercise Name 5 standing hip abd B   -LH      Reps 5 --  -LH      Exercise 6    Exercise Name 6 6\" step ups   -LH      Sets 6 2  -LH      Reps 6 10  -LH      Additional Comments fwd and lat  -LH      Exercise 7    Exercise Name 7 piriformis stretch   -LH      Reps 7 3  -LH      Time (Seconds) 7 20  -LH      Exercise 8    Exercise Name 8 prone hip ext  -LH      Sets 8 2  -LH      Reps 8 10  -LH        User Key  (r) = Recorded By, (t) = Taken By, (c) = Cosigned By    Initials Name Provider Type     Tabitha Erickson PT Physical Therapist           Manual Rx (last 36 hours)      Manual Treatments       01/30/18 0900          Manual Rx 1    Manual Rx 1 Location L gluteals  -      Manual Rx 1 Type STM in R SL with SI joint and ITBand release  -LH      Manual Rx 1 Duration 15  -LH        User Key  (r) = Recorded By, (t) = Taken By, (c) = Cosigned By    Initials Name Provider Type     Tabitha Erickson PT Physical Therapist                                Time Calculation:   Start Time: 0845  Stop Time: 0928  Time Calculation (min): 43 min     Therapy Charges for Today     Code Description Service Date Service Provider Modifiers Qty    33036888363 HC PT MOBILITY CURRENT 1/30/2018 Tabitha Erickson, PT GP, CJ 1    90293709530 HC PT MOBILITY PROJECTED 1/30/2018 Tabitha Erickson, PT GP, CI 1    77871251425 HC PT THER PROC EA 15 MIN 1/30/2018 Tabitha Erickson, PT GP 2    90439480801 HC PT MANUAL THERAPY EA 15 MIN 1/30/2018 Tabitha Erickson, PT GP 1          PT G-Codes  Functional Limitation: Mobility: Walking and moving around  Mobility: Walking and Moving Around Current Status (): " At least 20 percent but less than 40 percent impaired, limited or restricted  Mobility: Walking and Moving Around Goal Status (): At least 1 percent but less than 20 percent impaired, limited or restricted         Tabitha Erickson, PT  1/30/2018

## 2018-02-02 ENCOUNTER — TELEPHONE (OUTPATIENT)
Dept: INTERNAL MEDICINE | Facility: CLINIC | Age: 68
End: 2018-02-02

## 2018-02-02 ENCOUNTER — HOSPITAL ENCOUNTER (OUTPATIENT)
Dept: PHYSICAL THERAPY | Facility: HOSPITAL | Age: 68
Setting detail: THERAPIES SERIES
Discharge: HOME | End: 2018-02-02

## 2018-02-02 DIAGNOSIS — M70.72 BURSITIS OF LEFT HIP, UNSPECIFIED BURSA: Primary | ICD-10-CM

## 2018-02-02 PROCEDURE — 97110 THERAPEUTIC EXERCISES: CPT | Performed by: PHYSICAL THERAPIST

## 2018-02-02 PROCEDURE — 97140 MANUAL THERAPY 1/> REGIONS: CPT | Performed by: PHYSICAL THERAPIST

## 2018-02-02 NOTE — THERAPY TREATMENT NOTE
Outpatient Physical Therapy Ortho Treatment Note  [facility]     Patient Name: Tammy Torres  : 1950  MRN: [id number]  Today's Date: 2018      Visit Date: 2018    Visit Dx:    ICD-10-CM ICD-9-CM   1. Bursitis of left hip, unspecified bursa M70.72 726.5       Patient Active Problem List   Diagnosis   • Depression        Past Medical History:   Diagnosis Date   • Depression    • Hyperlipidemia    • Hypothyroidism    • Insomnia    • Mood disorder    • Osteopenia         Past Surgical History:   Procedure Laterality Date   • ANAL FISTULOTOMY     • COLONOSCOPY     • MAMMO BILATERAL     • PAP SMEAR      Normal             PT Ortho       18 1500    Subjective Comments    Subjective Comments It has been a little more the past couple of days   -    Subjective Pain    Able to rate subjective pain? yes  -    Pre-Treatment Pain Level 3  -    Post-Treatment Pain Level 2  -      User Key  (r) = Recorded By, (t) = Taken By, (c) = Cosigned By    Initials Name Provider Type     Tabitha Erickson, PT Physical Therapist                            PT Assessment/Plan       18 1550       PT Assessment    Assessment Comments Pt still having pain with increased walking, about 3/10. She has tenderness today in the gluteals but no trigger points. She is doing well with her HEP and is compliant.   -     PT Plan    PT Frequency 2x/week  -     PT Plan Comments cont  -       User Key  (r) = Recorded By, (t) = Taken By, (c) = Cosigned By    Initials Name Provider Type     Tabitha Erickson, PT Physical Therapist                    Exercises       18 1500          Subjective Comments    Subjective Comments It has been a little more the past couple of days   -      Subjective Pain    Able to rate subjective pain? yes  -      Pre-Treatment Pain Level 3  -LH      Post-Treatment Pain Level 2  -      Exercise 1    Exercise Name 1 ITB crossover stretch supine with strap  -    "   Reps 1 2  -LH      Time (Seconds) 1 30  -LH      Exercise 2    Exercise Name 2 sidelying hip abduction  -LH      Sets 2 2  -LH      Reps 2 10  -LH      Exercise 3    Exercise Name 3 bridges  -LH      Reps 3 20  -LH      Exercise 4    Exercise Name 4 HL hip abd w/ TB B and uni   -LH      Sets 4 2  -LH      Reps 4 10   ea  -LH      Additional Comments blue  -LH      Exercise 5    Exercise Name 5 standing hip abd B   -LH      Exercise 6    Exercise Name 6 6\" step ups   -LH      Sets 6 2  -LH      Reps 6 10  -LH      Exercise 7    Exercise Name 7 piriformis stretch   -LH      Reps 7 3  -LH      Time (Seconds) 7 20  -LH      Exercise 8    Exercise Name 8 prone hip ext  -LH      Sets 8 2  -LH      Reps 8 10  -LH        User Key  (r) = Recorded By, (t) = Taken By, (c) = Cosigned By    Initials Name Provider Type     Tabitha Erickson PT Physical Therapist                        Manual Rx (last 36 hours)      Manual Treatments       02/02/18 1500          Manual Rx 1    Manual Rx 1 Location L gluteals  -LH      Manual Rx 1 Type STM in R SL with SI joint and ITBand release  -LH      Manual Rx 1 Duration 15  -LH        User Key  (r) = Recorded By, (t) = Taken By, (c) = Cosigned By    Initials Name Provider Type     Tabitha Erickson PT Physical Therapist                             Time Calculation:   Start Time: 1508  Stop Time: 1551  Time Calculation (min): 43 min    Therapy Charges for Today     Code Description Service Date Service Provider Modifiers Qty    76097678016  PT THER PROC EA 15 MIN 2/2/2018 Tabitha Erickson, PT GP 2    [id number]  PT MANUAL THERAPY EA 15 MIN 2/2/2018 Tabitha Erickson, PT GP 1                    Tabitha Erickson, PT  2/2/2018     "

## 2018-02-02 NOTE — TELEPHONE ENCOUNTER
----- Message from Marilou Cardoza sent at 2/2/2018  2:43 PM EST -----  Contact: pt - Dr Dey's pt - RE: new Rx  Pt calling and would like to know if pt can get a Rx for Seroquel. Pt informs has trouble sleeping and not able to get Ambien. Could you please call Rx to pt's pharmacy? Please advise. Thanks    [facility] [address] [place] [address] [address] AT [facility].[address] - [phone] PH - [phone] FX      Pt # [phone]

## 2018-02-05 ENCOUNTER — TELEPHONE (OUTPATIENT)
Dept: INTERNAL MEDICINE | Facility: CLINIC | Age: 68
End: 2018-02-05

## 2018-02-05 DIAGNOSIS — G47.00 INSOMNIA, UNSPECIFIED TYPE: Primary | ICD-10-CM

## 2018-02-05 RX ORDER — QUETIAPINE FUMARATE 25 MG/1
25 TABLET, FILM COATED ORAL NIGHTLY
Qty: 30 TABLET | Refills: 0 | Status: SHIPPED | OUTPATIENT
Start: 2018-02-05 | End: 2018-02-28 | Stop reason: SDUPTHER

## 2018-02-05 NOTE — TELEPHONE ENCOUNTER
----- Message from Gloria Daniels sent at 2/5/2018  9:35 AM EST -----  Contact: pt  Pt would like to know if she could take low doses of seroquel for sleeping.   I called pt, she is not on Ambien any more and would like to try seroquel , her daughter was put on that too and she liked it    Pt#     [facility] [place] [id number] [address]

## 2018-02-09 ENCOUNTER — HOSPITAL ENCOUNTER (OUTPATIENT)
Dept: PHYSICAL THERAPY | Facility: HOSPITAL | Age: 68
Setting detail: THERAPIES SERIES
Discharge: HOME | End: 2018-02-09

## 2018-02-09 DIAGNOSIS — M70.72 BURSITIS OF LEFT HIP, UNSPECIFIED BURSA: Primary | ICD-10-CM

## 2018-02-09 PROCEDURE — 97110 THERAPEUTIC EXERCISES: CPT | Performed by: PHYSICAL THERAPIST

## 2018-02-09 PROCEDURE — 97140 MANUAL THERAPY 1/> REGIONS: CPT | Performed by: PHYSICAL THERAPIST

## 2018-02-09 NOTE — THERAPY TREATMENT NOTE
Outpatient Physical Therapy Ortho Treatment Note  [facility]     Patient Name: Tammy Torres  : 1950  MRN: [id number]  Today's Date: 2018      Visit Date: 2018    Visit Dx:    ICD-10-CM ICD-9-CM   1. Bursitis of left hip, unspecified bursa M70.72 726.5       Patient Active Problem List   Diagnosis   • Depression        Past Medical History:   Diagnosis Date   • Depression    • Hyperlipidemia    • Hypothyroidism    • Insomnia    • Mood disorder    • Osteopenia         Past Surgical History:   Procedure Laterality Date   • ANAL FISTULOTOMY     • COLONOSCOPY     • MAMMO BILATERAL     • PAP SMEAR      Normal                             PT Assessment/Plan       18 1032       PT Assessment    Assessment Comments Pt has started back into her aerobics class this week. She did not have pain during the class but was sore a few hours later. She has tenderness over the gluteals today, but it is mild and reduced with manual therapy. She reports that performing stairs is easier now and evenn when she does have pain now she can do them reciprocally.   -     PT Plan    PT Plan Comments cont for about 1 more week to cont to reduce pain and improve function  -       User Key  (r) = Recorded By, (t) = Taken By, (c) = Cosigned By    Initials Name Provider Type     Tabitha Erickson, PT Physical Therapist                    Exercises       18 0800          Subjective Comments    Subjective Comments i did go back to my aerobics classes this week and did ok. Did nn't have pain in the class but was sore after  -      Subjective Pain    Able to rate subjective pain? yes  -      Pre-Treatment Pain Level 3  -      Post-Treatment Pain Level 2  -      Exercise 1    Exercise Name 1 ITB crossover stretch supine with strap  -      Reps 1 2  -      Time (Seconds) 1 30  -LH      Exercise 2    Exercise Name 2 sidelying hip abduction  -      Sets 2 2  -      Reps 2 10  -LH       "Exercise 3    Exercise Name 3 bridges  -LH      Reps 3 20  -LH      Exercise 4    Exercise Name 4 HL hip abd w/ TB B and uni   -LH      Sets 4 2  -LH      Reps 4 10   ea  -LH      Time (Seconds) 4 --  -LH      Additional Comments black  -LH      Exercise 5    Exercise Name 5 standing hip abd B   -LH      Reps 5 --  -LH      Exercise 6    Exercise Name 6 6\" step ups   -LH      Sets 6 2  -LH      Reps 6 10  -LH      Exercise 7    Exercise Name 7 piriformis stretch   -LH      Reps 7 3  -LH      Time (Seconds) 7 20  -LH      Exercise 8    Exercise Name 8 prone hip ext  -LH      Sets 8 2  -LH      Reps 8 10  -LH        User Key  (r) = Recorded By, (t) = Taken By, (c) = Cosigned By    Initials Name Provider Type     Tabitha Erickson PT Physical Therapist                        Manual Rx (last 36 hours)      Manual Treatments       02/09/18 0900          Manual Rx 1    Manual Rx 1 Location L gluteals  -      Manual Rx 1 Type STM in R SL with SI joint and ITBand release  -LH      Manual Rx 1 Duration 15  -LH        User Key  (r) = Recorded By, (t) = Taken By, (c) = Cosigned By    Initials Name Provider Type     Tabitha Erickson PT Physical Therapist                             Time Calculation:   Start Time: 0845  Stop Time: 0930  Time Calculation (min): 45 min    Therapy Charges for Today     Code Description Service Date Service Provider Modifiers Qty    08466781906  PT THER PROC EA 15 MIN 2/9/2018 Tabitha Erickson, PT GP 2    [id number]  PT MANUAL THERAPY EA 15 MIN 2/9/2018 Tabitha Erickson, PT GP 1                    Tabitha Erickson, PT  2/9/2018     "

## 2018-02-14 ENCOUNTER — HOSPITAL ENCOUNTER (OUTPATIENT)
Dept: PHYSICAL THERAPY | Facility: HOSPITAL | Age: 68
Setting detail: THERAPIES SERIES
Discharge: HOME | End: 2018-02-14

## 2018-02-14 DIAGNOSIS — M70.72 BURSITIS OF LEFT HIP, UNSPECIFIED BURSA: Primary | ICD-10-CM

## 2018-02-14 PROCEDURE — G8979 MOBILITY GOAL STATUS: HCPCS | Performed by: PHYSICAL THERAPIST

## 2018-02-14 PROCEDURE — G8980 MOBILITY D/C STATUS: HCPCS | Performed by: PHYSICAL THERAPIST

## 2018-02-14 PROCEDURE — 97140 MANUAL THERAPY 1/> REGIONS: CPT | Performed by: PHYSICAL THERAPIST

## 2018-02-14 PROCEDURE — 97110 THERAPEUTIC EXERCISES: CPT | Performed by: PHYSICAL THERAPIST

## 2018-02-14 NOTE — THERAPY DISCHARGE NOTE
Outpatient Physical Therapy Ortho Treatment Note/Discharge Summary  [facility]     Patient Name: Tammy Torres  : 1950  MRN: [id number]  Today's Date: 2018      Visit Date: 2018    Visit Dx:    ICD-10-CM ICD-9-CM   1. Bursitis of left hip, unspecified bursa M70.72 726.5       Patient Active Problem List   Diagnosis   • Depression        Past Medical History:   Diagnosis Date   • Depression    • Hyperlipidemia    • Hypothyroidism    • Insomnia    • Mood disorder    • Osteopenia         Past Surgical History:   Procedure Laterality Date   • ANAL FISTULOTOMY     • COLONOSCOPY     • MAMMO BILATERAL     • PAP SMEAR      Normal             PT Ortho       18 0900    Left Hip    Hip Flexion Gross Movement (4+/5) good plus  -LH    Hip Extension Gross Movement (4+/5) good plus  -LH    Hip ABduction Gross Movement (4+/5) good plus  -LH    Hip Int (Medial) Rotation Gross Movement (4+/5) good plus  -LH    Hip Ext (Lat) Rotation Gross Movement (4+/5) good plus;(5/5) normal  -      User Key  (r) = Recorded By, (t) = Taken By, (c) = Cosigned By    Initials Name Provider Type     Tabitha Erickson, PT Physical Therapist                            PT Assessment/Plan       18 1024       PT Assessment    Assessment Comments Pt has started back into her gym classes without much difficulty. She has been unable to do much walking due to the weather but what she has done has had little pain in the L hip. She has some pain still with ascending the stairs but she is able to perform this reciprocally. She has improved her L hip strength to 4+ to 5/5. She is I with her HEP and is going to continue on her own.  -       User Key  (r) = Recorded By, (t) = Taken By, (c) = Cosigned By    Initials Name Provider Type     Tabitha Erickson PT Physical Therapist                    Exercises       18 0900          Subjective Comments    Subjective Comments I have been going to the [facility]  -    "   Subjective Pain    Able to rate subjective pain? yes  -LH      Pre-Treatment Pain Level 1  -LH      Post-Treatment Pain Level 0  -LH      Exercise 1    Exercise Name 1 ITB crossover stretch supine with strap  -LH      Reps 1 2  -LH      Time (Seconds) 1 30  -LH      Exercise 2    Exercise Name 2 sidelying hip abduction  -LH      Sets 2 2  -LH      Reps 2 10  -LH      Additional Comments 2#  -LH      Exercise 3    Exercise Name 3 bridges  -LH      Reps 3 20  -LH      Exercise 4    Exercise Name 4 HL hip abd w/ TB B and uni   -LH      Sets 4 2  -LH      Reps 4 10   ea  -LH      Time (Seconds) 4 --  -LH      Additional Comments black  -LH      Exercise 5    Exercise Name 5 standing hip abd B   -LH      Reps 5 --  -LH      Exercise 6    Exercise Name 6 6\" step ups   -LH      Sets 6 2  -LH      Reps 6 10  -LH      Exercise 7    Exercise Name 7 piriformis stretch   -LH      Reps 7 3  -LH      Time (Seconds) 7 20  -LH      Exercise 8    Exercise Name 8 prone hip ext  -LH      Sets 8 2  -LH      Reps 8 10  -LH        User Key  (r) = Recorded By, (t) = Taken By, (c) = Cosigned By    Initials Name Provider Type     Tabitha Erickson, PT Physical Therapist                        Manual Rx (last 36 hours)      Manual Treatments       02/14/18 0900          Manual Rx 1    Manual Rx 1 Location L gluteals  -      Manual Rx 1 Type STM in R SL with SI joint and ITBand release  -LH      Manual Rx 1 Duration 10  -LH        User Key  (r) = Recorded By, (t) = Taken By, (c) = Cosigned By    Initials Name Provider Type     Tabitha Erickson PT Physical Therapist                PT OP Goals       02/14/18 0900       PT Short Term Goals    STG 1 Patient will demonstrate safety and independence with initial HEP to reduce pain and manage symptoms  -     STG 1 Progress Met  -     STG 2 Patient will report 2/10 or less pain with stair climbing to increase ease with mobility  -     STG 2 Progress Partially Met  -     STG 2 " Progress Comments pain occasionally going up is 2-3/10  -     STG 3 Patient will increase B hip extension strength from 4/5 to 4+/5 to improve hip stability/strength for walking and stair climbing  -     STG 3 Progress Met  -     Long Term Goals    LTG 1 Patient will report reintegration of her 4-5 mile walking routine in order to promote good physical fitness  -     LTG 1 Progress Partially Met  -     LTG 1 Progress Comments weather has limited walking but doing aerobics class  -     LTG 2 Patient will report minimal to no pain with stair climbing to increase ease with mobility  -     LTG 2 Progress Partially Met  -     LTG 3 Patient will reduce level of perceived disability as measured by the LEFS from 19% disability to </=10% disability in order to improve quality of life  -     LTG 3 Progress Not Met  -       User Key  (r) = Recorded By, (t) = Taken By, (c) = Cosigned By    Initials Name Provider Type     Tabitha Erickson PT Physical Therapist          Therapy Education  Given: HEP, Symptoms/condition management  Program: Progressed  How Provided: Verbal  Provided to: Patient  Level of Understanding: Teach back education performed    Outcome Measure Options: Lower Extremity Functional Scale (LEFS) (65/80)         Time Calculation:   Start Time: 0930  Stop Time: 1015  Time Calculation (min): 45 min    Therapy Charges for Today     Code Description Service Date Service Provider Modifiers Qty    97530329559 HC PT MOBILITY PROJECTED 2/14/2018 Tabitha Erickson, PT GP, CI 1    59135967128 HC PT MOBILITY DISCHARGE 2/14/2018 Tabitha Erickson PT GP, CI 1    05925037278 HC PT THER PROC EA 15 MIN 2/14/2018 Tabitha Erickson PT GP 2    75495637367 HC PT MANUAL THERAPY EA 15 MIN 2/14/2018 Tabitha Erickson, PT GP 1          PT G-Codes  Outcome Measure Options: Lower Extremity Functional Scale (LEFS) (65/80)  Functional Limitation: Mobility: Walking and moving around  Mobility: Walking and Moving Around  Goal Status (): At least 1 percent but less than 20 percent impaired, limited or restricted  Mobility: Walking and Moving Around Discharge Status (): At least 1 percent but less than 20 percent impaired, limited or restricted     OP PT Discharge Summary  Date of Discharge: 02/14/18  Reason for Discharge: Independent  Outcomes Achieved: Patient able to partially acheive established goals  Discharge Destination: Home with home program  Discharge Instructions: pt to cont with HEP 3x week for strength and daily for flexibility      Tabitha Erickson, PT  2/14/2018

## 2018-02-21 DIAGNOSIS — E03.9 HYPOTHYROIDISM IN ADULT: ICD-10-CM

## 2018-02-21 RX ORDER — LEVOTHYROXINE SODIUM 0.05 MG/1
TABLET ORAL
Qty: 90 TABLET | Refills: 3 | Status: SHIPPED | OUTPATIENT
Start: 2018-02-21 | End: 2018-11-12 | Stop reason: SDUPTHER

## 2018-02-28 ENCOUNTER — TELEPHONE (OUTPATIENT)
Dept: INTERNAL MEDICINE | Facility: CLINIC | Age: 68
End: 2018-02-28

## 2018-02-28 DIAGNOSIS — G47.00 INSOMNIA, UNSPECIFIED TYPE: ICD-10-CM

## 2018-02-28 RX ORDER — QUETIAPINE FUMARATE 25 MG/1
25 TABLET, FILM COATED ORAL NIGHTLY
Qty: 30 TABLET | Refills: 3 | Status: SHIPPED | OUTPATIENT
Start: 2018-02-28 | End: 2018-06-29 | Stop reason: SDUPTHER

## 2018-02-28 NOTE — TELEPHONE ENCOUNTER
----- Message from Pau Knutson sent at 2/28/2018 10:05 AM EST -----  Contact: Patient  Patient called wanting refill on QUEtiapine (SEROQUEL) 25 MG tablet. Stated it runs out before her next visit on 5/14. Please advise    Patient::[phone]    Pharmacy:[facility] [address] [place] [address] [address] [facility] [address] - [phone]  - [phone] FX

## 2018-03-07 DIAGNOSIS — G47.00 INSOMNIA, UNSPECIFIED TYPE: ICD-10-CM

## 2018-03-07 RX ORDER — QUETIAPINE FUMARATE 25 MG/1
TABLET, FILM COATED ORAL
Qty: 30 TABLET | Refills: 5 | Status: SHIPPED | OUTPATIENT
Start: 2018-03-07 | End: 2018-09-06

## 2018-04-04 ENCOUNTER — TELEPHONE (OUTPATIENT)
Dept: INTERNAL MEDICINE | Facility: CLINIC | Age: 68
End: 2018-04-04

## 2018-04-04 DIAGNOSIS — M70.72 BURSITIS OF OTHER BURSA OF LEFT HIP: ICD-10-CM

## 2018-04-04 RX ORDER — MELOXICAM 15 MG/1
15 TABLET ORAL DAILY
Qty: 30 TABLET | Refills: 3 | Status: SHIPPED | OUTPATIENT
Start: 2018-04-04 | End: 2018-05-29 | Stop reason: SDUPTHER

## 2018-04-04 NOTE — TELEPHONE ENCOUNTER
Spoke with pt regarding her med Mobic, advised to take as needed it's NSAID drug just like motrin or advil , she said she will try ibuprofen OTC to see it that would help with her Bursitis and stop mobic and if that didn't help will go back to mobic. New rx sent to pharmacy per her request.

## 2018-04-04 NOTE — TELEPHONE ENCOUNTER
----- Message from Mairlou Cardoza sent at 4/4/2018  9:11 AM EDT -----  Contact: pt - Dr Dey's pt - RE: Rx  Pt calling and would like a refill on Rx. Pt would like to know if pt needs to continue taking Rx or if pt can discontinue. Could you please call pt to discuss? Please advise. Thanks      meloxicam (MOBIC) 15 MG tablet 30 tablet 7    Sig - Route: Take 1 tablet by mouth Daily. - Oral     [facility] [address] AT [facility].S. Counts include 234 beds at the [facility] [address] - [phone] PH - [phone] FX    Pt # [phone]

## 2018-05-02 ENCOUNTER — TELEPHONE (OUTPATIENT)
Dept: INTERNAL MEDICINE | Facility: CLINIC | Age: 68
End: 2018-05-02

## 2018-05-03 DIAGNOSIS — M25.552 PAIN OF LEFT HIP JOINT: Primary | ICD-10-CM

## 2018-05-29 ENCOUNTER — OFFICE VISIT (OUTPATIENT)
Dept: ORTHOPEDIC SURGERY | Facility: CLINIC | Age: 68
End: 2018-05-29

## 2018-05-29 VITALS — HEIGHT: 66 IN | BODY MASS INDEX: 23.14 KG/M2 | WEIGHT: 144 LBS | TEMPERATURE: 98.8 F

## 2018-05-29 DIAGNOSIS — M70.72 BURSITIS OF OTHER BURSA OF LEFT HIP: ICD-10-CM

## 2018-05-29 DIAGNOSIS — M25.551 BILATERAL HIP PAIN: Primary | ICD-10-CM

## 2018-05-29 DIAGNOSIS — M25.552 BILATERAL HIP PAIN: Primary | ICD-10-CM

## 2018-05-29 PROCEDURE — 99203 OFFICE O/P NEW LOW 30 MIN: CPT | Performed by: NURSE PRACTITIONER

## 2018-05-29 PROCEDURE — 73522 X-RAY EXAM HIPS BI 3-4 VIEWS: CPT | Performed by: NURSE PRACTITIONER

## 2018-05-29 RX ORDER — MELOXICAM 15 MG/1
15 TABLET ORAL DAILY
Qty: 30 TABLET | Refills: 3 | Status: SHIPPED | OUTPATIENT
Start: 2018-05-29 | End: 2018-11-12 | Stop reason: SDUPTHER
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
## 2018-06-27 ENCOUNTER — TELEPHONE (OUTPATIENT)
Dept: INTERNAL MEDICINE | Facility: CLINIC | Age: 68
End: 2018-06-27

## 2018-06-27 DIAGNOSIS — G47.00 INSOMNIA, UNSPECIFIED TYPE: ICD-10-CM

## 2018-06-27 DIAGNOSIS — I10 ESSENTIAL HYPERTENSION: ICD-10-CM

## 2018-06-27 NOTE — TELEPHONE ENCOUNTER
Pt is not due for refill. Last refill sent with 5 refills. verified that w/pharmacy. Called, informed pt she is due for OV and no more refills allowed till pt been seen.

## 2018-06-27 NOTE — TELEPHONE ENCOUNTER
----- Message from Becky Cabral sent at 6/27/2018 10:21 AM EDT -----  Contact: Patient   Patient calling for refill on     QUEtiapine (SEROquel) 25 MG tablet.  Please advise.     Patient:  [phone]    Pharmacy:  [facility] [address] [place] [address] [address] AT [facility] [address] - [phone]  - [phone] FX

## 2018-06-28 RX ORDER — TRIAMTERENE AND HYDROCHLOROTHIAZIDE 37.5; 25 MG/1; MG/1
TABLET ORAL
Qty: 30 TABLET | Refills: 0 | Status: SHIPPED | OUTPATIENT
Start: 2018-06-28 | End: 2018-08-28 | Stop reason: SDUPTHER

## 2018-06-29 DIAGNOSIS — G47.00 INSOMNIA, UNSPECIFIED TYPE: ICD-10-CM

## 2018-06-29 RX ORDER — QUETIAPINE FUMARATE 25 MG/1
25 TABLET, FILM COATED ORAL NIGHTLY
Qty: 30 TABLET | Refills: 0 | Status: SHIPPED | OUTPATIENT
Start: 2018-06-29 | End: 2018-11-12 | Stop reason: SDUPTHER

## 2018-08-28 DIAGNOSIS — I10 ESSENTIAL HYPERTENSION: ICD-10-CM

## 2018-08-28 RX ORDER — TRIAMTERENE AND HYDROCHLOROTHIAZIDE 37.5; 25 MG/1; MG/1
TABLET ORAL
Qty: 30 TABLET | Refills: 0 | Status: SHIPPED | OUTPATIENT
Start: 2018-08-28 | End: 2018-11-10 | Stop reason: SDUPTHER

## 2018-09-06 ENCOUNTER — OFFICE VISIT (OUTPATIENT)
Dept: INTERNAL MEDICINE | Facility: CLINIC | Age: 68
End: 2018-09-06

## 2018-09-06 VITALS
TEMPERATURE: 98 F | SYSTOLIC BLOOD PRESSURE: 120 MMHG | DIASTOLIC BLOOD PRESSURE: 82 MMHG | BODY MASS INDEX: 23.46 KG/M2 | HEIGHT: 66 IN | OXYGEN SATURATION: 98 % | WEIGHT: 146 LBS | HEART RATE: 78 BPM

## 2018-09-06 DIAGNOSIS — H43.391 VITREOUS FLOATERS OF RIGHT EYE: Primary | ICD-10-CM

## 2018-09-06 DIAGNOSIS — L30.9 DERMATITIS: ICD-10-CM

## 2018-09-06 PROCEDURE — 99213 OFFICE O/P EST LOW 20 MIN: CPT | Performed by: NURSE PRACTITIONER

## 2018-09-06 RX ORDER — CLOTRIMAZOLE AND BETAMETHASONE DIPROPIONATE 10; .64 MG/G; MG/G
CREAM TOPICAL EVERY 12 HOURS SCHEDULED
Qty: 45 G | Refills: 0 | Status: SHIPPED | OUTPATIENT
Start: 2018-09-06 | End: 2018-09-20

## 2018-09-06 NOTE — PROGRESS NOTES
Wes Munoz Workman is a 68 y.o. female.     She went to optometrist and was told she had floaters, precursors to macular degeneration.       Eye Problem    The right eye is affected. This is a new problem. Associated symptoms include itching. Pertinent negatives include no blurred vision, double vision, eye redness, fever, nausea, photophobia or recent URI.   Rash   This is a new problem. The current episode started in the past 7 days. The affected locations include the abdomen. The rash is characterized by redness and itchiness. She was exposed to nothing. Pertinent negatives include no cough, eye pain, fever or sore throat. Treatments tried: hydrocortisone  The treatment provided significant relief.        The following portions of the patient's history were reviewed and updated as appropriate: allergies, current medications, past family history, past medical history, past social history, past surgical history and problem list.    Review of Systems   Constitutional: Negative for fever.   HENT: Negative for sore throat.    Eyes: Positive for itching. Negative for blurred vision, double vision, photophobia, pain and redness.   Respiratory: Negative for cough.    Gastrointestinal: Negative for nausea.   Skin: Positive for rash (right breast ).       Objective   Physical Exam   Constitutional: She is oriented to person, place, and time. She appears well-developed and well-nourished.   HENT:   Head: Normocephalic.   Nose: Nose normal.   Eyes: Pupils are equal, round, and reactive to light. Conjunctivae, EOM and lids are normal.   Cardiovascular: Regular rhythm and normal heart sounds.  Exam reveals no S3 and no S4.    No murmur heard.  Pulmonary/Chest: Effort normal and breath sounds normal. She has no decreased breath sounds. She has no wheezes. She has no rhonchi. She has no rales.   Musculoskeletal: She exhibits no edema.   Neurological: She is alert and oriented to person, place, and time. Gait normal.    Skin: Skin is warm and dry. Rash noted.   Erythema based rash to right breast    Psychiatric: She has a normal mood and affect.       Assessment/Plan   Tammy was seen today for eye problem and rash.    Diagnoses and all orders for this visit:    Vitreous floaters of right eye  -     Ambulatory Referral for Diabetic Eye Exam-Ophthalmology    Dermatitis  Comments:  keep area dry;  Orders:  -     clotrimazole-betamethasone (LOTRISONE) 1-0.05 % cream; Apply  topically to the appropriate area as directed Every 12 (Twelve) Hours for 14 days.

## 2018-09-06 NOTE — PATIENT INSTRUCTIONS
Rash  A rash is a change in the color of the skin. A rash can also change the way your skin feels. There are many different conditions and factors that can cause a rash.  Follow these instructions at home:  Pay attention to any changes in your symptoms. Follow these instructions to help with your condition:  Medicine  Take or apply over-the-counter and prescription medicines only as told by your health care provider. These may include:  · Corticosteroid cream.  · Anti-itch lotions.  · Oral antihistamines.    Skin Care  · Apply cool compresses to the affected areas.  · Try taking a bath with:  ? Epsom salts. Follow the instructions on the packaging. You can get these at your local pharmacy or grocery store.  ? Baking soda. Pour a small amount into the bath as told by your health care provider.  ? Colloidal oatmeal. Follow the instructions on the packaging. You can get this at your local pharmacy or grocery store.  · Try applying baking soda paste to your skin. Stir water into baking soda until it reaches a paste-like consistency.  · Do not scratch or rub your skin.  · Avoid covering the rash. Make sure the rash is exposed to air as much as possible.  General instructions  · Avoid hot showers or baths, which can make itching worse. A cold shower may help.  · Avoid scented soaps, detergents, and perfumes. Use gentle soaps, detergents, perfumes, and other cosmetic products.  · Avoid any substance that causes your rash. Keep a journal to help track what causes your rash. Write down:  ? What you eat.  ? What cosmetic products you use.  ? What you drink.  ? What you wear. This includes jewelry.  · Keep all follow-up visits as told by your health care provider. This is important.  Contact a health care provider if:  · You sweat at night.  · You lose weight.  · You urinate more than normal.  · You feel weak.  · You vomit.  · Your skin or the whites of your eyes look yellow (jaundice).  · Your skin:  ? Tingles.  ? Is  numb.  · Your rash:  ? Does not go away after several days.  ? Gets worse.  · You are:  ? Unusually thirsty.  ? More tired than normal.  · You have:  ? New symptoms.  ? Pain in your abdomen.  ? A fever.  ? Diarrhea.  Get help right away if:  · You develop a rash that covers all or most of your body. The rash may or may not be painful.  · You develop blisters that:  ? Are on top of the rash.  ? Grow larger or grow together.  ? Are painful.  ? Are inside your nose or mouth.  · You develop a rash that:  ? Looks like purple pinprick-sized spots all over your body.  ? Has a “bull's eye” or looks like a target.  ? Is not related to sun exposure, is red and painful, and causes your skin to peel.  This information is not intended to replace advice given to you by your health care provider. Make sure you discuss any questions you have with your health care provider.  Document Released: 12/08/2003 Document Revised: 05/23/2017 Document Reviewed: 05/04/2016  Elsevier Interactive Patient Education © 2018 [employer]

## 2018-11-02 ENCOUNTER — OFFICE VISIT (OUTPATIENT)
Dept: INTERNAL MEDICINE | Facility: CLINIC | Age: 68
End: 2018-11-02

## 2018-11-02 VITALS
HEART RATE: 69 BPM | HEIGHT: 66 IN | BODY MASS INDEX: 23.63 KG/M2 | DIASTOLIC BLOOD PRESSURE: 78 MMHG | OXYGEN SATURATION: 97 % | SYSTOLIC BLOOD PRESSURE: 130 MMHG | WEIGHT: 147 LBS

## 2018-11-02 DIAGNOSIS — I10 ESSENTIAL HYPERTENSION: Primary | ICD-10-CM

## 2018-11-02 DIAGNOSIS — Z23 NEED FOR INFLUENZA VACCINATION: ICD-10-CM

## 2018-11-02 DIAGNOSIS — Z12.11 COLON CANCER SCREENING: ICD-10-CM

## 2018-11-02 DIAGNOSIS — E03.9 HYPOTHYROIDISM IN ADULT: ICD-10-CM

## 2018-11-02 PROCEDURE — G0008 ADMIN INFLUENZA VIRUS VAC: HCPCS | Performed by: NURSE PRACTITIONER

## 2018-11-02 PROCEDURE — 90662 IIV NO PRSV INCREASED AG IM: CPT | Performed by: NURSE PRACTITIONER

## 2018-11-02 PROCEDURE — 99214 OFFICE O/P EST MOD 30 MIN: CPT | Performed by: NURSE PRACTITIONER

## 2018-11-02 NOTE — PROGRESS NOTES
Wes Munoz Workman is a 68 y.o. female.     She checks her blood pressure routinely and readings less than 140/90. She exercises some.       Hypertension   This is a chronic problem. The current episode started more than 1 year ago. The problem is controlled. Pertinent negatives include no anxiety, blurred vision, chest pain, headaches, orthopnea, palpitations, peripheral edema, PND or shortness of breath. (Right eye floater ) Current antihypertension treatment includes diuretics. The current treatment provides significant improvement.   Hypothyroidism   This is a chronic problem. The current episode started more than 1 year ago. Pertinent negatives include no chest pain, coughing, fatigue, fever or headaches. Treatments tried: synthroid         The following portions of the patient's history were reviewed and updated as appropriate: allergies, current medications, past family history, past medical history, past social history, past surgical history and problem list.    Review of Systems   Constitutional: Positive for unexpected weight change. Negative for activity change, appetite change, fatigue and fever.   HENT:        Intermittent ear pressure    Eyes: Positive for visual disturbance (she is due for eye exam next week ). Negative for blurred vision.   Respiratory: Negative for cough, shortness of breath and wheezing.    Cardiovascular: Negative for chest pain, palpitations, orthopnea, leg swelling and PND.   Gastrointestinal: Negative for blood in stool and constipation.   Neurological: Positive for dizziness. Negative for headaches.   Psychiatric/Behavioral: Negative for suicidal ideas.       Objective   Physical Exam   Constitutional: She is oriented to person, place, and time. She appears well-developed and well-nourished.   HENT:   Head: Normocephalic.   Nose: Nose normal.   Neck: Carotid bruit is not present. No thyroid mass and no thyromegaly present.   Cardiovascular: Regular rhythm and normal  heart sounds.  Exam reveals no S3 and no S4.    No murmur heard.  Repeat bp left arm 122/72  No pedal edema    Pulmonary/Chest: Effort normal and breath sounds normal. She has no decreased breath sounds. She has no wheezes. She has no rhonchi. She has no rales.   Musculoskeletal: She exhibits no edema.   Neurological: She is alert and oriented to person, place, and time. Gait normal.   Skin: Skin is warm and dry.   Psychiatric: She has a normal mood and affect. Her speech is normal and behavior is normal. Judgment and thought content normal. Cognition and memory are normal.       Assessment/Plan   Tammy was seen today for hypertension and hypothyroidism.    Diagnoses and all orders for this visit:    Essential hypertension  -     Comprehensive Metabolic Panel; Future  -     Lipid Panel; Future  -     CBC (No Diff); Future    Hypothyroidism in adult  -     TSH Rfx On Abnormal To Free T4; Future    Need for influenza vaccination  -     Flu Vaccine High Dose PF 65YR+ ([id number])    Colon cancer screening  -     Cologuard - Stool, Per Rectum; Future      She will schedule mammogram with GYN   She declines pneumonia vaccination today.   She will return for fasting labs.

## 2018-11-10 DIAGNOSIS — I10 ESSENTIAL HYPERTENSION: ICD-10-CM

## 2018-11-12 ENCOUNTER — TELEPHONE (OUTPATIENT)
Dept: INTERNAL MEDICINE | Facility: CLINIC | Age: 68
End: 2018-11-12

## 2018-11-12 DIAGNOSIS — M70.72 BURSITIS OF OTHER BURSA OF LEFT HIP: ICD-10-CM

## 2018-11-12 DIAGNOSIS — E03.9 HYPOTHYROIDISM IN ADULT: ICD-10-CM

## 2018-11-12 DIAGNOSIS — G47.00 INSOMNIA, UNSPECIFIED TYPE: ICD-10-CM

## 2018-11-12 RX ORDER — QUETIAPINE FUMARATE 25 MG/1
25 TABLET, FILM COATED ORAL NIGHTLY
Qty: 90 TABLET | Refills: 1 | Status: SHIPPED | OUTPATIENT
Start: 2018-11-12 | End: 2019-04-12

## 2018-11-12 RX ORDER — TRIAMTERENE AND HYDROCHLOROTHIAZIDE 37.5; 25 MG/1; MG/1
TABLET ORAL
Qty: 30 TABLET | Refills: 5 | Status: SHIPPED | OUTPATIENT
Start: 2018-11-12 | End: 2019-07-23 | Stop reason: SDUPTHER

## 2018-11-12 RX ORDER — LEVOTHYROXINE SODIUM 0.05 MG/1
50 TABLET ORAL DAILY
Qty: 90 TABLET | Refills: 1 | Status: SHIPPED | OUTPATIENT
Start: 2018-11-12 | End: 2019-04-04 | Stop reason: SDUPTHER

## 2018-11-12 RX ORDER — MELOXICAM 15 MG/1
15 TABLET ORAL DAILY
Qty: 90 TABLET | Refills: 1 | Status: SHIPPED | OUTPATIENT
Start: 2018-11-12 | End: 2019-05-15

## 2018-11-12 NOTE — TELEPHONE ENCOUNTER
----- Message from Gloria Daniels sent at 11/12/2018 11:48 AM EST -----  Contact: Pt  Pt is calling for a refill     FOR   levothyroxine (SYNTHROID, LEVOTHROID) 50 MCG tablet     meloxicam (MOBIC) 15 MG tablet    QUEtiapine (SEROquel) 25 MG tablet    triamterene-hydrochlorothiazide (MAXZIDE-25) 37.5-25 MG per tablet         Patient requests RX SENT TO   [facility] [address] [place], [place] [address] AT [facility].[place] [address] [address] - [phone] [facility] [phone] FX    Caller# [phone]     Please and thank you.

## 2019-01-18 ENCOUNTER — TELEPHONE (OUTPATIENT)
Dept: INTERNAL MEDICINE | Facility: CLINIC | Age: 69
End: 2019-01-18

## 2019-01-18 NOTE — TELEPHONE ENCOUNTER
Left message to ask pt to call to let us know if she sent in Cologuard specimen to [facility].  The order was sent to [facility] on 11/5/18.   [facility] faxed on 1/4/19 to let is know they had not received sample.

## 2019-01-29 DIAGNOSIS — G47.00 INSOMNIA, UNSPECIFIED TYPE: ICD-10-CM

## 2019-01-29 RX ORDER — QUETIAPINE FUMARATE 25 MG/1
TABLET, FILM COATED ORAL
Qty: 30 TABLET | Refills: 0 | Status: SHIPPED | OUTPATIENT
Start: 2019-01-29 | End: 2019-04-12

## 2019-02-01 DIAGNOSIS — Z12.11 COLON CANCER SCREENING: ICD-10-CM

## 2019-04-04 ENCOUNTER — TELEPHONE (OUTPATIENT)
Dept: INTERNAL MEDICINE | Facility: CLINIC | Age: 69
End: 2019-04-04

## 2019-04-04 DIAGNOSIS — E03.9 HYPOTHYROIDISM IN ADULT: ICD-10-CM

## 2019-04-04 RX ORDER — LEVOTHYROXINE SODIUM 0.05 MG/1
50 TABLET ORAL DAILY
Qty: 90 TABLET | Refills: 1 | Status: SHIPPED | OUTPATIENT
Start: 2019-04-04 | End: 2019-10-05 | Stop reason: SDUPTHER

## 2019-04-04 NOTE — TELEPHONE ENCOUNTER
----- Message from Gloria Daniels sent at 4/4/2019  8:55 AM EDT -----  Contact: Pt  Pt is currently taking   QUEtiapine (SEROquel) 25 MG tablet  And would like to stop, doesn't know if she needs to ween or if she can just stop cold turkey.    Pt# [phone]

## 2019-04-12 ENCOUNTER — OFFICE VISIT (OUTPATIENT)
Dept: INTERNAL MEDICINE | Facility: CLINIC | Age: 69
End: 2019-04-12

## 2019-04-12 VITALS
BODY MASS INDEX: 23.78 KG/M2 | WEIGHT: 148 LBS | OXYGEN SATURATION: 98 % | HEIGHT: 66 IN | HEART RATE: 87 BPM | DIASTOLIC BLOOD PRESSURE: 80 MMHG | SYSTOLIC BLOOD PRESSURE: 122 MMHG | TEMPERATURE: 97.8 F

## 2019-04-12 DIAGNOSIS — R42 DIZZINESS: Primary | ICD-10-CM

## 2019-04-12 DIAGNOSIS — I10 ESSENTIAL HYPERTENSION: ICD-10-CM

## 2019-04-12 DIAGNOSIS — E03.9 HYPOTHYROIDISM IN ADULT: ICD-10-CM

## 2019-04-12 DIAGNOSIS — H65.01 RIGHT ACUTE SEROUS OTITIS MEDIA, RECURRENCE NOT SPECIFIED: ICD-10-CM

## 2019-04-12 LAB
ALBUMIN SERPL-MCNC: 4.7 G/DL (ref 3.5–5.2)
ALBUMIN/GLOB SERPL: 1.6 G/DL
ALP SERPL-CCNC: 71 U/L (ref 39–117)
ALT SERPL-CCNC: 15 U/L (ref 1–33)
AST SERPL-CCNC: 16 U/L (ref 1–32)
BILIRUB SERPL-MCNC: 0.6 MG/DL (ref 0.2–1.2)
BUN SERPL-MCNC: 12 MG/DL (ref 8–23)
BUN/CREAT SERPL: 10.7 (ref 7–25)
CALCIUM SERPL-MCNC: 10.4 MG/DL (ref 8.6–10.5)
CHLORIDE SERPL-SCNC: 93 MMOL/L (ref 98–107)
CHOLEST SERPL-MCNC: 241 MG/DL (ref 0–200)
CO2 SERPL-SCNC: 25 MMOL/L (ref 22–29)
CREAT SERPL-MCNC: 1.12 MG/DL (ref 0.57–1)
ERYTHROCYTE [DISTWIDTH] IN BLOOD BY AUTOMATED COUNT: 13.7 % (ref 12.3–15.4)
GLOBULIN SER CALC-MCNC: 3 GM/DL
GLUCOSE SERPL-MCNC: 95 MG/DL (ref 65–99)
HCT VFR BLD AUTO: 46.2 % (ref 34–46.6)
HDLC SERPL-MCNC: 58 MG/DL (ref 40–60)
HGB BLD-MCNC: 15.5 G/DL (ref 12–15.9)
LDLC SERPL CALC-MCNC: 161 MG/DL (ref 0–100)
MCH RBC QN AUTO: 29.1 PG (ref 26.6–33)
MCHC RBC AUTO-ENTMCNC: 33.5 G/DL (ref 31.5–35.7)
MCV RBC AUTO: 86.7 FL (ref 79–97)
PLATELET # BLD AUTO: 376 10*3/MM3 (ref 140–450)
POTASSIUM SERPL-SCNC: 4.4 MMOL/L (ref 3.5–5.2)
PROT SERPL-MCNC: 7.7 G/DL (ref 6–8.5)
RBC # BLD AUTO: 5.33 10*6/MM3 (ref 3.77–5.28)
SODIUM SERPL-SCNC: 136 MMOL/L (ref 136–145)
TRIGL SERPL-MCNC: 111 MG/DL (ref 0–150)
VLDLC SERPL-MCNC: 22.2 MG/DL (ref 5–40)
WBC # BLD AUTO: 4.59 10*3/MM3 (ref 3.4–10.8)

## 2019-04-12 PROCEDURE — 99214 OFFICE O/P EST MOD 30 MIN: CPT | Performed by: NURSE PRACTITIONER

## 2019-04-12 RX ORDER — MECLIZINE HYDROCHLORIDE 25 MG/1
25 TABLET ORAL 3 TIMES DAILY PRN
Qty: 30 TABLET | Refills: 0 | Status: SHIPPED | OUTPATIENT
Start: 2019-04-12

## 2019-04-12 RX ORDER — METHYLPREDNISOLONE 4 MG/1
TABLET ORAL
Qty: 21 TABLET | Refills: 0 | Status: SHIPPED | OUTPATIENT
Start: 2019-04-12 | End: 2019-05-15

## 2019-04-12 NOTE — PROGRESS NOTES
Wes Munoz Workman is a 68 y.o. female.     She has a history dizziness with inner ear problems. She reports about 4 weeks ago she started having symptoms, that don't last that long. It seems to be associated with position change or neck movement. She has been having some issues with itching in her ears. She tried benadryl and dramamine, because she was out of meclizine and didn't notice any improvement of symptoms.       Dizziness   This is a new problem. The current episode started 1 to 4 weeks ago. The problem occurs intermittently. The problem has been waxing and waning. Associated symptoms include vertigo. Pertinent negatives include no chest pain, chills, coughing, fatigue, fever, nausea, numbness, urinary symptoms, vomiting or weakness. Exacerbated by: movement (neck movement, more left side than right side),  She has tried rest (benadryl, draminine ) for the symptoms. The treatment provided mild relief.        The following portions of the patient's history were reviewed and updated as appropriate: allergies, current medications, past family history, past medical history, past social history, past surgical history and problem list.    Review of Systems   Constitutional: Negative for chills, fatigue and fever.   HENT: Positive for rhinorrhea and sneezing.         Bilateral ears itching    Eyes: Positive for visual disturbance (due for eye exam, ).   Respiratory: Negative for cough.    Cardiovascular: Negative for chest pain.   Gastrointestinal: Negative for nausea and vomiting.   Allergic/Immunologic: Positive for environmental allergies.   Neurological: Positive for dizziness and vertigo. Negative for tremors, speech difficulty, weakness, light-headedness and numbness.       Objective   Physical Exam   Constitutional: She appears well-developed and well-nourished. She is cooperative. She does not have a sickly appearance. She does not appear ill.   HENT:   Head: Normocephalic.   Right Ear: Hearing  and external ear normal. No drainage, swelling or tenderness. No mastoid tenderness. Tympanic membrane is bulging. Tympanic membrane is not injected, not scarred and not erythematous. Tympanic membrane mobility is normal. A middle ear effusion is present. No decreased hearing is noted.   Left Ear: Hearing and external ear normal. No drainage, swelling or tenderness. No mastoid tenderness. Tympanic membrane is not injected, not scarred, not erythematous and not bulging. Tympanic membrane mobility is normal.  No middle ear effusion. No decreased hearing is noted.   Nose: Nose normal. No mucosal edema, rhinorrhea, sinus tenderness or nasal deformity. Right sinus exhibits no maxillary sinus tenderness and no frontal sinus tenderness. Left sinus exhibits no maxillary sinus tenderness and no frontal sinus tenderness.   Mouth/Throat: Mucous membranes are normal. Normal dentition. No posterior oropharyngeal erythema. No tonsillar exudate.   Right ear with visible bubbles    Eyes: Conjunctivae, EOM and lids are normal. Pupils are equal, round, and reactive to light. Right eye exhibits no discharge and no exudate. Left eye exhibits no discharge and no exudate.   Neck: Trachea normal and normal range of motion. Carotid bruit is not present. No edema present. No thyroid mass and no thyromegaly present.   Cardiovascular: Regular rhythm, normal heart sounds and normal pulses.   No murmur heard.  Repeat bp left arm 124/78  No pedal edema noted   Pulmonary/Chest: Breath sounds normal. No respiratory distress. She has no decreased breath sounds. She has no wheezes. She has no rhonchi. She has no rales.   Lymphadenopathy:        Head (right side): No submental, no submandibular, no tonsillar, no preauricular, no posterior auricular and no occipital adenopathy present.        Head (left side): No submental, no submandibular, no tonsillar, no preauricular, no posterior auricular and no occipital adenopathy present.     She has no  cervical adenopathy.   Neurological: She is alert. No cranial nerve deficit.   Equal  strength   Skin: Skin is warm, dry and intact. No cyanosis. Nails show no clubbing.   Psychiatric: Her speech is normal and behavior is normal. Judgment and thought content normal. Cognition and memory are normal.       Assessment/Plan   Tammy was seen today for dizziness.    Diagnoses and all orders for this visit:    Dizziness  -     meclizine (ANTIVERT) 25 MG tablet; Take 1 tablet by mouth 3 (Three) Times a Day As Needed for dizziness or Nausea.    Hypothyroidism in adult  -     TSH Rfx On Abnormal To Free T4; Future  -     TSH Rfx On Abnormal To Free T4    Essential hypertension  Comments:  stable  Orders:  -     Comprehensive Metabolic Panel; Future  -     Lipid Panel; Future  -     CBC (No Diff); Future  -     Comprehensive Metabolic Panel  -     Lipid Panel  -     CBC (No Diff)    Right acute serous otitis media, recurrence not specified  -     methylPREDNISolone (MEDROL) 4 MG tablet; follow package directions  -     fluticasone (FLONASE SENSIMIST) 27.5 MCG/SPRAY nasal spray; 2 sprays into the nostril(s) as directed by provider Daily.      She was advised to shingrix. She will keep her follow up appointment.   She is fasting so will check labs.

## 2019-04-13 LAB — TSH SERPL-ACNC: 3.74 MIU/ML (ref 0.27–4.2)

## 2019-04-22 ENCOUNTER — TELEPHONE (OUTPATIENT)
Dept: INTERNAL MEDICINE | Facility: CLINIC | Age: 69
End: 2019-04-22

## 2019-04-22 NOTE — TELEPHONE ENCOUNTER
I returned call to patient and discussed her lab work. I recommend she hydrate well and limit use of meloxicam. Will recheck at next appt. May need to adjust medications further. Also discussed elevated cholesterol due to severe myalgias, she would like to work on low fat/cholesterol diet and exercise. Will need to monitor.

## 2019-04-22 NOTE — TELEPHONE ENCOUNTER
Pt was concerned about her low GFR and how she should be concerned about it, informed her of your note. Agreed to try med for cholesterol but she tried statin before ( pitavastatin and atorvastatin ) and couldn't tolerated. Have navjot with Hina next month.    Please advise

## 2019-04-22 NOTE — TELEPHONE ENCOUNTER
Contact: pt - Dr Taylor' pt - RE: discuss results  Pt calling and would like a return call regarding results pt rec'd over the w/e. She informs ws instructed to call to discuss  once results were rec'd. Could you please call pt to suyapaus? Bebeto jaimes. Thanks    Pt # [phone]

## 2019-05-15 ENCOUNTER — OFFICE VISIT (OUTPATIENT)
Dept: INTERNAL MEDICINE | Facility: CLINIC | Age: 69
End: 2019-05-15

## 2019-05-15 VITALS
BODY MASS INDEX: 23.46 KG/M2 | OXYGEN SATURATION: 97 % | WEIGHT: 146 LBS | DIASTOLIC BLOOD PRESSURE: 72 MMHG | SYSTOLIC BLOOD PRESSURE: 136 MMHG | HEART RATE: 72 BPM | HEIGHT: 66 IN

## 2019-05-15 DIAGNOSIS — G47.00 INSOMNIA, UNSPECIFIED TYPE: ICD-10-CM

## 2019-05-15 DIAGNOSIS — H81.13 BENIGN PAROXYSMAL POSITIONAL VERTIGO DUE TO BILATERAL VESTIBULAR DISORDER: Primary | ICD-10-CM

## 2019-05-15 DIAGNOSIS — Z12.39 SCREENING FOR BREAST CANCER: ICD-10-CM

## 2019-05-15 DIAGNOSIS — I10 ESSENTIAL HYPERTENSION: ICD-10-CM

## 2019-05-15 DIAGNOSIS — E03.9 HYPOTHYROIDISM IN ADULT: ICD-10-CM

## 2019-05-15 PROCEDURE — 99213 OFFICE O/P EST LOW 20 MIN: CPT | Performed by: NURSE PRACTITIONER

## 2019-05-15 PROCEDURE — G0439 PPPS, SUBSEQ VISIT: HCPCS | Performed by: NURSE PRACTITIONER

## 2019-05-15 RX ORDER — ONDANSETRON 4 MG/1
4 TABLET, FILM COATED ORAL EVERY 8 HOURS PRN
Qty: 30 TABLET | Refills: 0 | Status: SHIPPED | OUTPATIENT
Start: 2019-05-15 | End: 2023-01-30 | Stop reason: SDUPTHER

## 2019-05-15 NOTE — PROGRESS NOTES
QUICK REFERENCE INFORMATION:  The ABCs of the Annual Wellness Visit    Subsequent Medicare Wellness Visit     HEALTH RISK ASSESSMENT    : 1950    Recent Hospitalizations:  No hospitalization(s) within the last year..  ccc      Current Medical Providers:  Patient Care Team:  Cal Dey MD as PCP - General (Internal Medicine)  Cal Dey MD as Consulting Physician (Internal Medicine)        Smoking Status:  Social History     Tobacco Use   Smoking Status Never Smoker   Smokeless Tobacco Never Used       Alcohol Consumption:  Social History     Substance and Sexual Activity   Alcohol Use Yes    Comment: Occcasional       Depression Screen:   PHQ-2/PHQ-9 Depression Screening 5/15/2019   Little interest or pleasure in doing things 0   Feeling down, depressed, or hopeless 0   Trouble falling or staying asleep, or sleeping too much 3   Feeling tired or having little energy 1   Poor appetite or overeating 0   Feeling bad about yourself - or that you are a failure or have let yourself or your family down 0   Trouble concentrating on things, such as reading the newspaper or watching television 0   Moving or speaking so slowly that other people could have noticed. Or the opposite - being so fidgety or restless that you have been moving around a lot more than usual 0   Thoughts that you would be better off dead, or of hurting yourself in some way 0   Total Score 4   If you checked off any problems, how difficult have these problems made it for you to do your work, take care of things at home, or get along with other people? Not difficult at all       Health Habits and Functional and Cognitive Screening:  Functional & Cognitive Status 5/15/2019   Do you have difficulty preparing food and eating? No   Do you have difficulty bathing yourself, getting dressed or grooming yourself? No   Do you have difficulty using the toilet? No   Do you have difficulty moving around from place to place? No   Do you have trouble  with steps or getting out of a bed or a chair? No   In the past year have you fallen or experienced a near fall? No   Current Diet Well Balanced Diet   Dental Exam Up to date   Eye Exam Up to date   Exercise (times per week) 2 times per week   Current Exercise Activities Include Walking   Do you need help using the phone?  No   Are you deaf or do you have serious difficulty hearing?  Yes   Do you need help with transportation? No   Do you need help shopping? No   Do you need help preparing meals?  No   Do you need help with housework?  No   Do you need help with laundry? No   Do you need help taking your medications? No   Do you need help managing money? No   Do you ever drive or ride in a car without wearing a seat belt? No   Have you felt unusual stress, anger or loneliness in the last month? No   Who do you live with? Spouse   If you need help, do you have trouble finding someone available to you? No   Have you been bothered in the last four weeks by sexual problems? No   Do you have difficulty concentrating, remembering or making decisions? No           Does the patient have evidence of cognitive impairment? No    Asiprin use counseling: Does not need ASA (and currently is not on it)      Recent Lab Results:    Lab Results   Component Value Date    GLU 95 04/12/2019        Lab Results   Component Value Date    CHOL 258 (H) 04/26/2017    TRIG 111 04/12/2019    HDL 58 04/12/2019    VLDL 22.2 04/12/2019    LDLHDL 2.70 04/26/2017           Age-appropriate Screening Schedule:  Refer to the list below for future screening recommendations based on patient's age, sex and/or medical conditions. Orders for these recommended tests are listed in the plan section. The patient has been provided with a written plan.    Health Maintenance   Topic Date Due   • TDAP/TD VACCINES (1 - Tdap) 07/05/1969   • ZOSTER VACCINE (1 of 2) 07/05/2000   • MAMMOGRAM  08/05/2016   • DXA SCAN  01/16/2017   • PNEUMOCOCCAL VACCINES (65+ LOW/MEDIUM  RISK) (1 of 2 - PCV13) 05/15/2021 (Originally 7/5/2015)   • INFLUENZA VACCINE  08/01/2019   • LIPID PANEL  04/12/2020        Subjective   History of Present Illness    Tammy Torres is a 68 y.o. female who presents for an Annual Wellness Visit.    The following portions of the patient's history were reviewed and updated as appropriate: allergies, current medications, past family history, past medical history, past social history, past surgical history and problem list.    Outpatient Medications Prior to Visit   Medication Sig Dispense Refill   • fluticasone (FLONASE SENSIMIST) 27.5 MCG/SPRAY nasal spray 2 sprays into the nostril(s) as directed by provider Daily. 5.9 mL 0   • levothyroxine (SYNTHROID, LEVOTHROID) 50 MCG tablet Take 1 tablet by mouth Daily. 90 tablet 1   • meclizine (ANTIVERT) 25 MG tablet Take 1 tablet by mouth 3 (Three) Times a Day As Needed for dizziness or Nausea. 30 tablet 0   • triamterene-hydrochlorothiazide (MAXZIDE-25) 37.5-25 MG per tablet TAKE 1 TABLET BY MOUTH EVERY DAY 30 tablet 5   • cholecalciferol (VITAMIN D3) 1000 UNITS tablet Take 1,000 Units by mouth daily.     • meloxicam (MOBIC) 15 MG tablet Take 1 tablet by mouth Daily. 90 tablet 1   • methylPREDNISolone (MEDROL) 4 MG tablet follow package directions 21 tablet 0   • Multiple Vitamin (MULTI VITAMIN DAILY PO) Take  by mouth.     • TURMERIC PO Take  by mouth.       No facility-administered medications prior to visit.        Patient Active Problem List   Diagnosis   • Depression       Advance Care Planning:  Patient has an advance directive - a copy has not been provided. Have asked the patient to send this to us to add to record    Identification of Risk Factors:  Risk factors include: insomnia, vertigo.    Review of Systems    Compared to one year ago, the patient feels her physical health is the same.  Compared to one year ago, the patient feels her mental health is the same.    Objective     Physical Exam    Vitals:    05/15/19  "1118   BP: 136/72   BP Location: Left arm   Patient Position: Sitting   Cuff Size: Adult   Pulse: 72   SpO2: 97%   Weight: 66.2 kg (146 lb)   Height: 167.6 cm (66\")   PainSc:   2   PainLoc: Hip       Patient's Body mass index is 23.57 kg/m². BMI is within normal parameters. No follow-up required..      Assessment/Plan   Patient Self-Management and Personalized Health Advice  The patient has been provided with information about: diet, exercise, weight management and prevention of cardiac or vascular disease and preventive services including:   · Screening mammography, referral placed, TdaP vaccine, Zostavax vaccine (Herpes Zoster).    Visit Diagnoses:    ICD-10-CM ICD-9-CM   1. Benign paroxysmal positional vertigo due to bilateral vestibular disorder H81.13 386.11   2. Hypothyroidism in adult E03.9 244.9   3. Screening for breast cancer Z12.31 V76.10   4. Essential hypertension I10 401.9       Orders Placed This Encounter   Procedures   • Mammo Screening Bilateral With CAD     Standing Status:   Future     Standing Expiration Date:   5/14/2020     Order Specific Question:   Reason for Exam:     Answer:   Screening   • Basic Metabolic Panel     Standing Status:   Future     Standing Expiration Date:   5/14/2020       Outpatient Encounter Medications as of 5/15/2019   Medication Sig Dispense Refill   • fluticasone (FLONASE SENSIMIST) 27.5 MCG/SPRAY nasal spray 2 sprays into the nostril(s) as directed by provider Daily. 5.9 mL 0   • levothyroxine (SYNTHROID, LEVOTHROID) 50 MCG tablet Take 1 tablet by mouth Daily. 90 tablet 1   • meclizine (ANTIVERT) 25 MG tablet Take 1 tablet by mouth 3 (Three) Times a Day As Needed for dizziness or Nausea. 30 tablet 0   • triamterene-hydrochlorothiazide (MAXZIDE-25) 37.5-25 MG per tablet TAKE 1 TABLET BY MOUTH EVERY DAY 30 tablet 5   • ondansetron (ZOFRAN) 4 MG tablet Take 1 tablet by mouth Every 8 (Eight) Hours As Needed for Nausea or Vomiting for up to 3 days. 30 tablet 0   • " [DISCONTINUED] cholecalciferol (VITAMIN D3) 1000 UNITS tablet Take 1,000 Units by mouth daily.     • [DISCONTINUED] meloxicam (MOBIC) 15 MG tablet Take 1 tablet by mouth Daily. 90 tablet 1   • [DISCONTINUED] methylPREDNISolone (MEDROL) 4 MG tablet follow package directions 21 tablet 0   • [DISCONTINUED] Multiple Vitamin (MULTI VITAMIN DAILY PO) Take  by mouth.     • [DISCONTINUED] TURMERIC PO Take  by mouth.       No facility-administered encounter medications on file as of 5/15/2019.        Reviewed use of high risk medication in the elderly: yes  Reviewed for potential of harmful drug interactions in the elderly: yes    Follow Up:  Return in about 6 months (around 11/15/2019).     An After Visit Summary and PPPS with all of these plans were given to the patient.

## 2019-05-16 DIAGNOSIS — M70.72 BURSITIS OF OTHER BURSA OF LEFT HIP: ICD-10-CM

## 2019-05-16 RX ORDER — MELOXICAM 15 MG/1
15 TABLET ORAL DAILY
Qty: 90 TABLET | Refills: 0 | Status: SHIPPED | OUTPATIENT
Start: 2019-05-16 | End: 2019-11-19

## 2019-05-17 RX ORDER — QUETIAPINE FUMARATE 25 MG/1
25 TABLET, FILM COATED ORAL NIGHTLY PRN
Qty: 30 TABLET | Refills: 0
Start: 2019-05-17 | End: 2019-08-15 | Stop reason: SDUPTHER

## 2019-05-17 NOTE — PROGRESS NOTES
Wes Torres is a 68 y.o. female who presents for f/u regarding hypothyroidism and HTN.    She reports improvement in her vertigo but continues to complain of intermittent symptoms which are worse with change in position.  She takes Meclizine for breakthrough symptoms which has been helpful.  She has attended physical therapy in the past for her symptoms which she states was not very helpful.  Denies chest pain or palpitations.      Hypertension   This is a chronic problem. The current episode started more than 1 year ago. The problem is controlled. Pertinent negatives include no anxiety, chest pain, headaches, palpitations, peripheral edema or shortness of breath. Current antihypertension treatment includes diuretics. The current treatment provides significant improvement. There are no compliance problems.    Hypothyroidism   This is a chronic problem. The current episode started more than 1 year ago. Associated symptoms include congestion. Pertinent negatives include no abdominal pain, arthralgias, chest pain, chills, coughing, fatigue, fever, headaches, joint swelling, nausea, sore throat, vomiting or weakness. Treatments tried: Synthroid. The treatment provided significant relief.        The following portions of the patient's history were reviewed and updated as appropriate: allergies, current medications, past social history and problem list.    Past Medical History:   Diagnosis Date   • Depression    • Hyperlipidemia    • Hypothyroidism    • Insomnia    • Mood disorder (CMS/[facility])    • Osteopenia          Current Outpatient Medications:   •  fluticasone (FLONASE SENSIMIST) 27.5 MCG/SPRAY nasal spray, 2 sprays into the nostril(s) as directed by provider Daily., Disp: 5.9 mL, Rfl: 0  •  levothyroxine (SYNTHROID, LEVOTHROID) 50 MCG tablet, Take 1 tablet by mouth Daily., Disp: 90 tablet, Rfl: 1  •  meclizine (ANTIVERT) 25 MG tablet, Take 1 tablet by mouth 3 (Three) Times a Day As Needed for dizziness  "or Nausea., Disp: 30 tablet, Rfl: 0  •  triamterene-hydrochlorothiazide (MAXZIDE-25) 37.5-25 MG per tablet, TAKE 1 TABLET BY MOUTH EVERY DAY, Disp: 30 tablet, Rfl: 5  •  meloxicam (MOBIC) 15 MG tablet, TAKE 1 TABLET BY MOUTH DAILY, Disp: 90 tablet, Rfl: 0  •  ondansetron (ZOFRAN) 4 MG tablet, Take 1 tablet by mouth Every 8 (Eight) Hours As Needed for Nausea or Vomiting for up to 3 days., Disp: 30 tablet, Rfl: 0  •  QUEtiapine (SEROquel) 25 MG tablet, Take 1 tablet by mouth At Night As Needed (insomnia)., Disp: 30 tablet, Rfl: 0    Allergies   Allergen Reactions   • Atorvastatin Myalgia   • Hydrocodone Nausea And Vomiting   • Oxycodone Nausea And Vomiting   • Pitavastatin Myalgia   • Codeine GI Intolerance and Nausea And Vomiting       Review of Systems   Constitutional: Negative for chills, fatigue, fever and unexpected weight change.   HENT: Positive for congestion and postnasal drip. Negative for ear pain, sinus pressure, sore throat and trouble swallowing.    Eyes: Negative for visual disturbance.   Respiratory: Negative for cough, chest tightness, shortness of breath and wheezing.    Cardiovascular: Negative for chest pain, palpitations and leg swelling.   Gastrointestinal: Negative for abdominal pain, blood in stool, nausea and vomiting.   Genitourinary: Negative for dysuria, frequency and urgency.   Musculoskeletal: Negative for arthralgias and joint swelling.   Skin: Negative for color change.   Neurological: Positive for dizziness and light-headedness. Negative for syncope, weakness and headaches.   Hematological: Does not bruise/bleed easily.   Psychiatric/Behavioral: Positive for sleep disturbance.       Objective   Vitals:    05/15/19 1118   BP: 136/72   BP Location: Left arm   Patient Position: Sitting   Cuff Size: Adult   Pulse: 72   SpO2: 97%   Weight: 66.2 kg (146 lb)   Height: 167.6 cm (66\")     Physical Exam   Constitutional: She appears well-developed and well-nourished. She is cooperative. She " does not have a sickly appearance. She does not appear ill.   HENT:   Head: Normocephalic.   Right Ear: Hearing, tympanic membrane and external ear normal.   Left Ear: Hearing, tympanic membrane and external ear normal.   Nose: Nose normal. No mucosal edema, rhinorrhea, sinus tenderness or nasal deformity. Right sinus exhibits no maxillary sinus tenderness and no frontal sinus tenderness. Left sinus exhibits no maxillary sinus tenderness and no frontal sinus tenderness.   Mouth/Throat: Mucous membranes are normal. Normal dentition. Posterior oropharyngeal erythema present.   Eyes: Conjunctivae and lids are normal. Right eye exhibits no discharge and no exudate. Left eye exhibits no discharge and no exudate.   Neck: Trachea normal. Carotid bruit is not present. No edema present. No thyroid mass present.   Cardiovascular: Regular rhythm, normal heart sounds and normal pulses.   No murmur heard.  Pulmonary/Chest: Breath sounds normal. No respiratory distress. She has no decreased breath sounds. She has no wheezes. She has no rhonchi. She has no rales.   Lymphadenopathy:        Head (right side): No submental, no submandibular, no tonsillar, no preauricular, no posterior auricular and no occipital adenopathy present.        Head (left side): No submental, no submandibular, no tonsillar, no preauricular, no posterior auricular and no occipital adenopathy present.   Neurological: She is alert.   Skin: Skin is warm, dry and intact. No cyanosis. Nails show no clubbing.       Assessment/Plan   Tammy was seen today for medicare wellness-subsequent.    Diagnoses and all orders for this visit:    Benign paroxysmal positional vertigo due to bilateral vestibular disorder  -     ondansetron (ZOFRAN) 4 MG tablet; Take 1 tablet by mouth Every 8 (Eight) Hours As Needed for Nausea or Vomiting for up to 3 days.    Hypothyroidism in adult  Comments:  TSH within therapeutic range with 4/2019 labs    Essential hypertension  -     Basic  Metabolic Panel; Future    Insomnia, unspecified type  -     QUEtiapine (SEROquel) 25 MG tablet; Take 1 tablet by mouth At Night As Needed (insomnia).    Screening for breast cancer  -     Mammo Screening Bilateral With CAD; Future    She has a long-standing history of insomnia.  She was previously on Ambien which she has discontinued and has tried other medications in the past.  She did well with Seroquel which she has at home but is hesitant to use.  We discussed taking it intermittently on an as-needed basis at a low dose of 25 mg.  We have reviewed recent labs which showed a mildly decreased kidney function.  She had been taking meloxicam daily which she has since stopped.  She will continue to increase fluids and return for recheck of her kidney function in 1 month.

## 2019-05-17 NOTE — PATIENT INSTRUCTIONS
Medicare Wellness  Personal Prevention Plan of Service     Date of Office Visit:  05/15/2019  Encounter Provider:  MARILOU Alston  Place of Service:  [facility]  Patient Name: Tammy Torres  :  1950    As part of the Medicare Wellness portion of your visit today, we are providing you with this personalized preventive plan of services (PPPS). This plan is based upon recommendations of the [place] Preventive Services Task Force (USPSTF) and the Advisory Committee on Immunization Practices (ACIP).    This lists the preventive care services that should be considered, and provides dates of when you are due. Items listed as completed are up-to-date and do not require any further intervention.    Health Maintenance   Topic Date Due   • TDAP/TD VACCINES (1 - Tdap) 1969   • HEPATITIS C SCREENING  2016   • MAMMOGRAM  2016   • DXA SCAN  2017   • MEDICARE ANNUAL WELLNESS  2018   • ZOSTER VACCINE (1 of 2) 2020 (Originally 2000)   • PNEUMOCOCCAL VACCINES (65+ LOW/MEDIUM RISK) (1 of 2 - PCV13) 05/15/2021 (Originally 2015)   • INFLUENZA VACCINE  2019   • LIPID PANEL  2020   • COLOGUARD  2022       Orders Placed This Encounter   Procedures   • Mammo Screening Bilateral With CAD     Standing Status:   Future     Standing Expiration Date:   2020     Order Specific Question:   Reason for Exam:     Answer:   Screening   • Basic Metabolic Panel     Standing Status:   Future     Standing Expiration Date:   2020       Return in about 6 months (around 11/15/2019).

## 2019-06-20 ENCOUNTER — OFFICE VISIT (OUTPATIENT)
Dept: INTERNAL MEDICINE | Facility: CLINIC | Age: 69
End: 2019-06-20

## 2019-06-20 ENCOUNTER — APPOINTMENT (OUTPATIENT)
Dept: WOMENS IMAGING | Facility: HOSPITAL | Age: 69
End: 2019-06-20

## 2019-06-20 DIAGNOSIS — Z12.39 SCREENING FOR BREAST CANCER: ICD-10-CM

## 2019-06-20 PROCEDURE — 77067 SCR MAMMO BI INCL CAD: CPT | Performed by: NURSE PRACTITIONER

## 2019-06-20 PROCEDURE — 77067 SCR MAMMO BI INCL CAD: CPT | Performed by: RADIOLOGY

## 2019-07-15 ENCOUNTER — LAB (OUTPATIENT)
Dept: INTERNAL MEDICINE | Facility: CLINIC | Age: 69
End: 2019-07-15

## 2019-07-15 DIAGNOSIS — I10 ESSENTIAL HYPERTENSION: ICD-10-CM

## 2019-07-15 PROCEDURE — 36415 COLL VENOUS BLD VENIPUNCTURE: CPT | Performed by: NURSE PRACTITIONER

## 2019-07-16 LAB
BUN SERPL-MCNC: 12 MG/DL (ref 8–23)
BUN/CREAT SERPL: 11.4 (ref 7–25)
CALCIUM SERPL-MCNC: 9.5 MG/DL (ref 8.6–10.5)
CHLORIDE SERPL-SCNC: 102 MMOL/L (ref 98–107)
CO2 SERPL-SCNC: 27.5 MMOL/L (ref 22–29)
CREAT SERPL-MCNC: 1.05 MG/DL (ref 0.57–1)
GLUCOSE SERPL-MCNC: 132 MG/DL (ref 65–99)
POTASSIUM SERPL-SCNC: 4.2 MMOL/L (ref 3.5–5.2)
SODIUM SERPL-SCNC: 141 MMOL/L (ref 136–145)

## 2019-07-23 DIAGNOSIS — I10 ESSENTIAL HYPERTENSION: ICD-10-CM

## 2019-07-23 RX ORDER — TRIAMTERENE AND HYDROCHLOROTHIAZIDE 37.5; 25 MG/1; MG/1
1 TABLET ORAL DAILY
Qty: 30 TABLET | Refills: 5 | Status: SHIPPED | OUTPATIENT
Start: 2019-07-23 | End: 2019-11-19

## 2019-08-15 DIAGNOSIS — G47.00 INSOMNIA, UNSPECIFIED TYPE: ICD-10-CM

## 2019-08-15 RX ORDER — QUETIAPINE FUMARATE 25 MG/1
25 TABLET, FILM COATED ORAL NIGHTLY PRN
Qty: 30 TABLET | Refills: 0 | Status: SHIPPED | OUTPATIENT
Start: 2019-08-15 | End: 2019-09-18 | Stop reason: SDUPTHER

## 2019-09-18 DIAGNOSIS — G47.00 INSOMNIA, UNSPECIFIED TYPE: ICD-10-CM

## 2019-09-19 RX ORDER — QUETIAPINE FUMARATE 25 MG/1
TABLET, FILM COATED ORAL
Qty: 30 TABLET | Refills: 3 | Status: SHIPPED | OUTPATIENT
Start: 2019-09-19 | End: 2020-01-27

## 2019-10-05 DIAGNOSIS — E03.9 HYPOTHYROIDISM IN ADULT: ICD-10-CM

## 2019-10-07 RX ORDER — LEVOTHYROXINE SODIUM 0.05 MG/1
50 TABLET ORAL DAILY
Qty: 90 TABLET | Refills: 0 | Status: SHIPPED | OUTPATIENT
Start: 2019-10-07 | End: 2019-11-21 | Stop reason: DRUGHIGH

## 2019-11-19 ENCOUNTER — OFFICE VISIT (OUTPATIENT)
Dept: INTERNAL MEDICINE | Facility: CLINIC | Age: 69
End: 2019-11-19

## 2019-11-19 VITALS
BODY MASS INDEX: 23.95 KG/M2 | DIASTOLIC BLOOD PRESSURE: 80 MMHG | HEART RATE: 72 BPM | OXYGEN SATURATION: 98 % | SYSTOLIC BLOOD PRESSURE: 124 MMHG | HEIGHT: 66 IN | WEIGHT: 149 LBS

## 2019-11-19 DIAGNOSIS — E03.9 HYPOTHYROIDISM IN ADULT: Primary | ICD-10-CM

## 2019-11-19 DIAGNOSIS — Z11.59 SCREENING FOR VIRAL DISEASE: ICD-10-CM

## 2019-11-19 DIAGNOSIS — I10 ESSENTIAL HYPERTENSION: ICD-10-CM

## 2019-11-19 DIAGNOSIS — Z23 NEED FOR INFLUENZA VACCINATION: ICD-10-CM

## 2019-11-19 DIAGNOSIS — H81.13 BENIGN PAROXYSMAL POSITIONAL VERTIGO DUE TO BILATERAL VESTIBULAR DISORDER: ICD-10-CM

## 2019-11-19 LAB
ALBUMIN SERPL-MCNC: 5 G/DL (ref 3.5–5.2)
ALBUMIN/GLOB SERPL: 2.2 G/DL
ALP SERPL-CCNC: 92 U/L (ref 39–117)
ALT SERPL-CCNC: 11 U/L (ref 1–33)
AST SERPL-CCNC: 17 U/L (ref 1–32)
BASOPHILS # BLD AUTO: 0.07 10*3/MM3 (ref 0–0.2)
BASOPHILS NFR BLD AUTO: 1.5 % (ref 0–1.5)
BILIRUB SERPL-MCNC: 0.5 MG/DL (ref 0.2–1.2)
BUN SERPL-MCNC: 10 MG/DL (ref 8–23)
BUN/CREAT SERPL: 11.4 (ref 7–25)
CALCIUM SERPL-MCNC: 9.5 MG/DL (ref 8.6–10.5)
CHLORIDE SERPL-SCNC: 95 MMOL/L (ref 98–107)
CHOLEST SERPL-MCNC: 247 MG/DL (ref 0–200)
CO2 SERPL-SCNC: 27.4 MMOL/L (ref 22–29)
CREAT SERPL-MCNC: 0.88 MG/DL (ref 0.57–1)
EOSINOPHIL # BLD AUTO: 0.08 10*3/MM3 (ref 0–0.4)
EOSINOPHIL # BLD AUTO: 1.7 % (ref 0.3–6.2)
ERYTHROCYTE [DISTWIDTH] IN BLOOD BY AUTOMATED COUNT: 13.9 % (ref 12.3–15.4)
GLOBULIN SER CALC-MCNC: 2.3 GM/DL
GLUCOSE SERPL-MCNC: 95 MG/DL (ref 65–99)
HCT VFR BLD AUTO: 43.7 % (ref 34–46.6)
HDLC SERPL-MCNC: 57 MG/DL (ref 40–60)
HGB BLD-MCNC: 14.4 G/DL (ref 12–15.9)
IMM GRANULOCYTES # BLD: 0.01 10*3/MM3 (ref 0–0.05)
IMM GRANULOCYTES NFR BLD: 0.2 % (ref 0–0.5)
LDLC SERPL CALC-MCNC: 166 MG/DL (ref 0–100)
LYMPHOCYTES # BLD AUTO: 1.78 10*3/MM3 (ref 0.7–3.1)
LYMPHOCYTES NFR BLD AUTO: 36.9 % (ref 19.6–45.3)
MCH RBC QN AUTO: 29 PG (ref 26.6–33)
MCHC RBC AUTO-ENTMCNC: 33 G/DL (ref 31.5–35.7)
MCV RBC AUTO: 87.9 FL (ref 79–97)
MONOCYTES # BLD AUTO: 0.41 10*3/MM3 (ref 0.1–0.9)
MONOCYTES NFR BLD AUTO: 8.5 % (ref 5–12)
NEUTROPHILS # BLD AUTO: 2.47 10*3/MM3 (ref 1.7–7)
NEUTROPHILS NFR BLD AUTO: 51.2 % (ref 42.7–76)
NRBC BLD AUTO-RTO: 0 /100 WBC (ref 0–0.2)
PLATELET # BLD AUTO: 375 10*3/MM3 (ref 140–450)
POTASSIUM SERPL-SCNC: 4 MMOL/L (ref 3.5–5.2)
PROT SERPL-MCNC: 7.3 G/DL (ref 6–8.5)
RBC # BLD AUTO: 4.97 10*6/MM3 (ref 3.77–5.28)
SODIUM SERPL-SCNC: 136 MMOL/L (ref 136–145)
TRIGL SERPL-MCNC: 121 MG/DL (ref 0–150)
TSH SERPL-ACNC: 9.39 UIU/ML (ref 0.27–4.2)
VLDLC SERPL-MCNC: 24.2 MG/DL
WBC # BLD AUTO: 4.82 10*3/MM3 (ref 3.4–10.8)

## 2019-11-19 PROCEDURE — 90653 IIV ADJUVANT VACCINE IM: CPT | Performed by: NURSE PRACTITIONER

## 2019-11-19 PROCEDURE — 99214 OFFICE O/P EST MOD 30 MIN: CPT | Performed by: NURSE PRACTITIONER

## 2019-11-19 PROCEDURE — G0008 ADMIN INFLUENZA VIRUS VAC: HCPCS | Performed by: NURSE PRACTITIONER

## 2019-11-19 RX ORDER — LISINOPRIL 5 MG/1
5 TABLET ORAL DAILY
Qty: 30 TABLET | Refills: 3 | Status: SHIPPED | OUTPATIENT
Start: 2019-11-19 | End: 2020-03-09

## 2019-11-20 LAB — HCV AB S/CO SERPL IA: <0.1 S/CO RATIO (ref 0–0.9)

## 2019-11-21 DIAGNOSIS — E03.9 HYPOTHYROIDISM, UNSPECIFIED TYPE: Primary | ICD-10-CM

## 2019-11-21 DIAGNOSIS — E03.9 HYPOTHYROIDISM IN ADULT: Primary | ICD-10-CM

## 2019-11-21 RX ORDER — LEVOTHYROXINE SODIUM 0.07 MG/1
75 TABLET ORAL DAILY
Qty: 90 TABLET | Refills: 1 | Status: SHIPPED | OUTPATIENT
Start: 2019-11-21 | End: 2020-05-12

## 2019-11-22 NOTE — PROGRESS NOTES
Wes Torres is a 69 y.o. female who presents for f/u regarding HTN, hypothyroidism and insomnia.    She has discontinued Meloxicam due to changes in kidney function and her labs have improved, takes Tylenol as needed for joint pain and/or stiffness.  She c/o increased postnasal drainage and congestion over the past week,  has been ill with a URI.      Hypertension   This is a chronic problem. The current episode started more than 1 year ago. The problem is unchanged. The problem is controlled. Pertinent negatives include no chest pain, headaches or palpitations. Current antihypertension treatment includes ACE inhibitors. The current treatment provides significant improvement. There are no compliance problems.         The following portions of the patient's history were reviewed and updated as appropriate: allergies, current medications, past social history and problem list.    Past Medical History:   Diagnosis Date   • Depression    • Hyperlipidemia    • Hypothyroidism    • Insomnia    • Mood disorder (CMS/HCC)    • Osteopenia          Current Outpatient Medications:   •  fluticasone (FLONASE SENSIMIST) 27.5 MCG/SPRAY nasal spray, 2 sprays into the nostril(s) as directed by provider Daily., Disp: 5.9 mL, Rfl: 0  •  meclizine (ANTIVERT) 25 MG tablet, Take 1 tablet by mouth 3 (Three) Times a Day As Needed for dizziness or Nausea., Disp: 30 tablet, Rfl: 0  •  QUEtiapine (SEROquel) 25 MG tablet, TAKE 1 TABLET BY MOUTH AT NIGHT AS NEEDED FOR INSOMNIA, Disp: 30 tablet, Rfl: 3  •  levothyroxine (SYNTHROID, LEVOTHROID) 75 MCG tablet, Take 1 tablet by mouth Daily., Disp: 90 tablet, Rfl: 1  •  lisinopril (PRINIVIL,ZESTRIL) 5 MG tablet, Take 1 tablet by mouth Daily., Disp: 30 tablet, Rfl: 3    Allergies   Allergen Reactions   • Atorvastatin Myalgia   • Hydrocodone Nausea And Vomiting   • Oxycodone Nausea And Vomiting   • Pitavastatin Myalgia   • Codeine GI Intolerance and Nausea And Vomiting       Review  "of Systems   Constitutional: Negative for chills, fatigue, fever and unexpected weight change.   HENT: Positive for congestion and postnasal drip. Negative for ear pain, sinus pressure, sore throat and trouble swallowing.    Eyes: Negative for visual disturbance.   Respiratory: Negative for cough, chest tightness and wheezing.    Cardiovascular: Negative for chest pain, palpitations and leg swelling.   Gastrointestinal: Negative for abdominal pain, blood in stool, nausea and vomiting.   Genitourinary: Negative for dysuria, frequency and urgency.   Musculoskeletal: Positive for arthralgias. Negative for joint swelling.   Skin: Negative for color change.   Neurological: Negative for syncope, weakness and headaches.   Hematological: Does not bruise/bleed easily.   Psychiatric/Behavioral: Positive for sleep disturbance (improved with Seroquel).       Objective   Vitals:    11/19/19 0944   BP: 124/80   BP Location: Left arm   Patient Position: Sitting   Cuff Size: Adult   Pulse: 72   SpO2: 98%   Weight: 67.6 kg (149 lb)   Height: 167.6 cm (66\")     Body mass index is 24.05 kg/m².  Physical Exam   Constitutional: She appears well-developed and well-nourished. She is cooperative. She does not have a sickly appearance. She does not appear ill.   HENT:   Head: Normocephalic.   Right Ear: Hearing, tympanic membrane and external ear normal.   Left Ear: Hearing, tympanic membrane and external ear normal.   Nose: Nose normal. No mucosal edema, rhinorrhea, sinus tenderness or nasal deformity. Right sinus exhibits no maxillary sinus tenderness and no frontal sinus tenderness. Left sinus exhibits no maxillary sinus tenderness and no frontal sinus tenderness.   Mouth/Throat: Mucous membranes are normal. Normal dentition. Posterior oropharyngeal erythema present.   Eyes: Conjunctivae and lids are normal. Right eye exhibits no discharge and no exudate. Left eye exhibits no discharge and no exudate.   Neck: Trachea normal. Carotid " bruit is not present. No edema present. No thyroid mass present.   Cardiovascular: Regular rhythm, normal heart sounds and normal pulses.   No murmur heard.  Pulmonary/Chest: Breath sounds normal. No respiratory distress. She has no decreased breath sounds. She has no wheezes. She has no rhonchi. She has no rales.   Abdominal: Soft. There is no tenderness.   Lymphadenopathy:        Head (right side): No submental, no submandibular, no tonsillar, no preauricular, no posterior auricular and no occipital adenopathy present.        Head (left side): No submental, no submandibular, no tonsillar, no preauricular, no posterior auricular and no occipital adenopathy present.   Neurological: She is alert.   Skin: Skin is warm, dry and intact. No cyanosis. Nails show no clubbing.       Assessment/Plan   Tammy was seen today for follow-up.    Diagnoses and all orders for this visit:    Hypothyroidism in adult  -     TSH; Future  -     TSH    Essential hypertension  -     lisinopril (PRINIVIL,ZESTRIL) 5 MG tablet; Take 1 tablet by mouth Daily.  -     CBC Auto Differential; Future  -     Comprehensive Metabolic Panel; Future  -     Lipid Panel; Future  -     CBC Auto Differential  -     Comprehensive Metabolic Panel  -     Lipid Panel    Benign paroxysmal positional vertigo due to bilateral vestibular disorder    Screening for viral disease  -     Hepatitis C Antibody; Future  -     Hepatitis C Antibody    Need for influenza vaccination  -     Fluad Quad >65 years (6848-1082)    She has declined Prevnar 13 and Pneumovax 23 despite known risks. Discussed Shingrix vaccine, she will check with pharmacy regarding coverage and availability.  She is doing well with current medications, check fasting lab today.

## 2019-11-26 ENCOUNTER — TELEPHONE (OUTPATIENT)
Dept: INTERNAL MEDICINE | Facility: CLINIC | Age: 69
End: 2019-11-26

## 2019-11-26 NOTE — TELEPHONE ENCOUNTER
"Pt called the [facility] and left a voice message at 3:06pm:      \"Yes this is Tammy workman. My birthdate is [birth year]. I need to speak with Hina about my new medications. I'm having some swelling in my lymph nodes and  in my neck and a little bit of pain in my ears and I wondered if my two new medicines might be affecting that or causing me to have that situation and I just wish somebody would call me back and let me know. My best phone no. is [phone]. Thank you.\"    "

## 2019-11-27 ENCOUNTER — OFFICE VISIT (OUTPATIENT)
Dept: INTERNAL MEDICINE | Facility: CLINIC | Age: 69
End: 2019-11-27

## 2019-11-27 VITALS
HEART RATE: 71 BPM | SYSTOLIC BLOOD PRESSURE: 140 MMHG | DIASTOLIC BLOOD PRESSURE: 82 MMHG | TEMPERATURE: 98.2 F | HEIGHT: 66 IN | BODY MASS INDEX: 24.11 KG/M2 | OXYGEN SATURATION: 98 % | WEIGHT: 150 LBS

## 2019-11-27 DIAGNOSIS — E03.9 HYPOTHYROIDISM IN ADULT: ICD-10-CM

## 2019-11-27 DIAGNOSIS — I10 ESSENTIAL HYPERTENSION: Primary | ICD-10-CM

## 2019-11-27 DIAGNOSIS — J06.9 VIRAL URI: ICD-10-CM

## 2019-11-27 PROCEDURE — 99213 OFFICE O/P EST LOW 20 MIN: CPT | Performed by: NURSE PRACTITIONER

## 2019-11-27 RX ORDER — CETIRIZINE HYDROCHLORIDE 10 MG/1
10 TABLET ORAL DAILY
COMMUNITY

## 2019-11-29 RX ORDER — GUAIFENESIN 600 MG/1
600 TABLET, EXTENDED RELEASE ORAL EVERY 12 HOURS SCHEDULED
Qty: 14 TABLET
Start: 2019-11-29 | End: 2019-12-06
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
## 2020-01-24 DIAGNOSIS — G47.00 INSOMNIA, UNSPECIFIED TYPE: ICD-10-CM

## 2020-01-27 RX ORDER — QUETIAPINE FUMARATE 25 MG/1
TABLET, FILM COATED ORAL
Qty: 30 TABLET | Refills: 3 | Status: SHIPPED | OUTPATIENT
Start: 2020-01-27 | End: 2020-05-29

## 2020-03-07 DIAGNOSIS — I10 ESSENTIAL HYPERTENSION: ICD-10-CM

## 2020-03-09 RX ORDER — LISINOPRIL 5 MG/1
5 TABLET ORAL DAILY
Qty: 30 TABLET | Refills: 3 | Status: SHIPPED | OUTPATIENT
Start: 2020-03-09 | End: 2020-05-04

## 2020-05-01 ENCOUNTER — TELEPHONE (OUTPATIENT)
Dept: INTERNAL MEDICINE | Facility: CLINIC | Age: 70
End: 2020-05-01

## 2020-05-01 ENCOUNTER — TELEMEDICINE (OUTPATIENT)
Dept: INTERNAL MEDICINE | Facility: CLINIC | Age: 70
End: 2020-05-01

## 2020-05-01 DIAGNOSIS — K59.00 CONSTIPATION, UNSPECIFIED CONSTIPATION TYPE: Primary | ICD-10-CM

## 2020-05-01 PROCEDURE — 99213 OFFICE O/P EST LOW 20 MIN: CPT | Performed by: NURSE PRACTITIONER

## 2020-05-01 NOTE — TELEPHONE ENCOUNTER
Patient states that she is experiencing constipation for the past 3-4 days. Took medication yesterday and it has not helped. She is concerned because it has been 3-4 days since she's had a bowel movement. Please advise. Patient's preferred call back is [phone] (H)    confirmed  [id number] - [place] [place] - [address] AT [facility].[address] - [phone]  - [phone]   [phone]

## 2020-05-01 NOTE — PROGRESS NOTES
Wes Torres is a 69 y.o. female who presents for a video visit due to constipation.    She has changed her diet to more of a high-protein, low carb diet which has indirectly led to less fiber intake. She has noticed constipation over the past 3-4 days without abdominal pain or rectal bleeding. She take Colace yesterday with some stool production.  She had a ColoGuard tet done 1/2019, denies medication changes other than addition of Biotin.       The following portions of the patient's history were reviewed and updated as appropriate: allergies, current medications, past social history and problem list.    Past Medical History:   Diagnosis Date   • Depression    • Hyperlipidemia    • Hypothyroidism    • Insomnia    • Mood disorder (CMS/HCC)    • Osteopenia          Current Outpatient Medications:   •  cetirizine (zyrTEC) 10 MG tablet, Take 10 mg by mouth Daily., Disp: , Rfl:   •  fluticasone (FLONASE SENSIMIST) 27.5 MCG/SPRAY nasal spray, 2 sprays into the nostril(s) as directed by provider Daily., Disp: 5.9 mL, Rfl: 0  •  levothyroxine (SYNTHROID, LEVOTHROID) 75 MCG tablet, Take 1 tablet by mouth Daily., Disp: 90 tablet, Rfl: 1  •  lisinopril (PRINIVIL,ZESTRIL) 5 MG tablet, TAKE 1 TABLET BY MOUTH DAILY, Disp: 30 tablet, Rfl: 3  •  meclizine (ANTIVERT) 25 MG tablet, Take 1 tablet by mouth 3 (Three) Times a Day As Needed for dizziness or Nausea., Disp: 30 tablet, Rfl: 0  •  QUEtiapine (SEROquel) 25 MG tablet, TAKE 1 TABLET BY MOUTH AT NIGHT AS NEEDED FOR INSOMNIA, Disp: 30 tablet, Rfl: 3  •  polyethylene glycol (MIRALAX) 17 g packet, Take 17 g by mouth Daily., Disp: 1 each, Rfl: 0    Allergies   Allergen Reactions   • Atorvastatin Myalgia   • Hydrocodone Nausea And Vomiting   • Oxycodone Nausea And Vomiting   • Pitavastatin Myalgia   • Codeine GI Intolerance and Nausea And Vomiting       Review of Systems   Constitutional: Negative for chills, fatigue, fever and unexpected weight change.   HENT:  Negative for congestion, ear pain, postnasal drip, sinus pressure, sore throat and trouble swallowing.    Eyes: Negative for visual disturbance.   Respiratory: Negative for cough, chest tightness and wheezing.    Cardiovascular: Negative for chest pain, palpitations and leg swelling.   Gastrointestinal: Positive for constipation. Negative for abdominal pain, blood in stool, nausea and vomiting.   Genitourinary: Negative for dysuria, frequency and urgency.   Musculoskeletal: Negative for arthralgias and joint swelling.   Skin: Negative for color change.   Neurological: Negative for syncope, weakness and headaches.   Hematological: Does not bruise/bleed easily.       Objective   There were no vitals filed for this visit.  There is no height or weight on file to calculate BMI.  Physical Exam   Constitutional: She appears well-developed and well-nourished.   HENT:   Head: Normocephalic and atraumatic.   Eyes: Conjunctivae are normal. Right eye exhibits no discharge. Left eye exhibits no discharge.   Pulmonary/Chest: Effort normal.   Psychiatric: She has a normal mood and affect. Her behavior is normal. Judgment and thought content normal.       Assessment/Plan   Tammy was seen today for constipation.    Diagnoses and all orders for this visit:    Constipation, unspecified constipation type  -     polyethylene glycol (MIRALAX) 17 g packet; Take 17 g by mouth Daily.    Discussed symptoms at length with patient which is most likely due to decreased fiber intake. She will start a fiber supplement but also begin Miralax daily for now. Continue to monitor--she has a f/u appt 5/19 and we will re-evaluate, if sx have not improved we will consider further evaluation.    History and medical judgement obtained from video conversation with patient. No hands-on physical examination was performed. Approximately 15 minutes spent in video conversation with patient.

## 2020-05-03 RX ORDER — POLYETHYLENE GLYCOL 3350 17 G/17G
17 POWDER, FOR SOLUTION ORAL DAILY
Qty: 1 EACH | Refills: 0
Start: 2020-05-03 | End: 2022-04-08

## 2020-05-04 DIAGNOSIS — I10 ESSENTIAL HYPERTENSION: ICD-10-CM

## 2020-05-04 RX ORDER — LISINOPRIL 5 MG/1
5 TABLET ORAL DAILY
Qty: 30 TABLET | Refills: 3 | Status: SHIPPED | OUTPATIENT
Start: 2020-05-04 | End: 2020-08-27

## 2020-05-12 DIAGNOSIS — E03.9 HYPOTHYROIDISM, UNSPECIFIED TYPE: ICD-10-CM

## 2020-05-12 RX ORDER — LEVOTHYROXINE SODIUM 0.07 MG/1
75 TABLET ORAL DAILY
Qty: 90 TABLET | Refills: 0 | Status: SHIPPED | OUTPATIENT
Start: 2020-05-12 | End: 2020-08-10

## 2020-05-19 ENCOUNTER — OFFICE VISIT (OUTPATIENT)
Dept: INTERNAL MEDICINE | Facility: CLINIC | Age: 70
End: 2020-05-19

## 2020-05-19 VITALS
BODY MASS INDEX: 23.46 KG/M2 | OXYGEN SATURATION: 98 % | HEIGHT: 66 IN | HEART RATE: 61 BPM | WEIGHT: 146 LBS | TEMPERATURE: 97.9 F | DIASTOLIC BLOOD PRESSURE: 80 MMHG | SYSTOLIC BLOOD PRESSURE: 126 MMHG

## 2020-05-19 DIAGNOSIS — G47.00 INSOMNIA, UNSPECIFIED TYPE: ICD-10-CM

## 2020-05-19 DIAGNOSIS — I10 ESSENTIAL HYPERTENSION: Primary | ICD-10-CM

## 2020-05-19 DIAGNOSIS — K59.00 CONSTIPATION, UNSPECIFIED CONSTIPATION TYPE: ICD-10-CM

## 2020-05-19 DIAGNOSIS — E03.9 ACQUIRED HYPOTHYROIDISM: ICD-10-CM

## 2020-05-19 LAB
ALBUMIN SERPL-MCNC: 4.6 G/DL (ref 3.5–5.2)
ALBUMIN/GLOB SERPL: 1.6 G/DL
ALP SERPL-CCNC: 78 U/L (ref 39–117)
ALT SERPL-CCNC: 17 U/L (ref 1–33)
AST SERPL-CCNC: 19 U/L (ref 1–32)
BASOPHILS # BLD AUTO: 0.06 10*3/MM3 (ref 0–0.2)
BASOPHILS NFR BLD AUTO: 1.4 % (ref 0–1.5)
BILIRUB SERPL-MCNC: 0.5 MG/DL (ref 0.2–1.2)
BUN SERPL-MCNC: 15 MG/DL (ref 8–23)
BUN/CREAT SERPL: 13.6 (ref 7–25)
CALCIUM SERPL-MCNC: 10 MG/DL (ref 8.6–10.5)
CHLORIDE SERPL-SCNC: 100 MMOL/L (ref 98–107)
CHOLEST SERPL-MCNC: 248 MG/DL (ref 0–200)
CO2 SERPL-SCNC: 26.3 MMOL/L (ref 22–29)
CREAT SERPL-MCNC: 1.1 MG/DL (ref 0.57–1)
EOSINOPHIL # BLD AUTO: 0.09 10*3/MM3 (ref 0–0.4)
EOSINOPHIL NFR BLD AUTO: 2.1 % (ref 0.3–6.2)
ERYTHROCYTE [DISTWIDTH] IN BLOOD BY AUTOMATED COUNT: 14.3 % (ref 12.3–15.4)
GLOBULIN SER CALC-MCNC: 2.8 GM/DL
GLUCOSE SERPL-MCNC: 104 MG/DL (ref 65–99)
HCT VFR BLD AUTO: 42.8 % (ref 34–46.6)
HDLC SERPL-MCNC: 62 MG/DL (ref 40–60)
HGB BLD-MCNC: 14.5 G/DL (ref 12–15.9)
IMM GRANULOCYTES # BLD AUTO: 0.01 10*3/MM3 (ref 0–0.05)
IMM GRANULOCYTES NFR BLD AUTO: 0.2 % (ref 0–0.5)
LDLC SERPL CALC-MCNC: 163 MG/DL (ref 0–100)
LYMPHOCYTES # BLD AUTO: 1.71 10*3/MM3 (ref 0.7–3.1)
LYMPHOCYTES NFR BLD AUTO: 39 % (ref 19.6–45.3)
MCH RBC QN AUTO: 29.8 PG (ref 26.6–33)
MCHC RBC AUTO-ENTMCNC: 33.9 G/DL (ref 31.5–35.7)
MCV RBC AUTO: 87.9 FL (ref 79–97)
MONOCYTES # BLD AUTO: 0.4 10*3/MM3 (ref 0.1–0.9)
MONOCYTES NFR BLD AUTO: 9.1 % (ref 5–12)
NEUTROPHILS # BLD AUTO: 2.12 10*3/MM3 (ref 1.7–7)
NEUTROPHILS NFR BLD AUTO: 48.2 % (ref 42.7–76)
NRBC BLD AUTO-RTO: 0 /100 WBC (ref 0–0.2)
PLATELET # BLD AUTO: 332 10*3/MM3 (ref 140–450)
POTASSIUM SERPL-SCNC: 4.2 MMOL/L (ref 3.5–5.2)
PROT SERPL-MCNC: 7.4 G/DL (ref 6–8.5)
RBC # BLD AUTO: 4.87 10*6/MM3 (ref 3.77–5.28)
SODIUM SERPL-SCNC: 138 MMOL/L (ref 136–145)
TRIGL SERPL-MCNC: 115 MG/DL (ref 0–150)
TSH SERPL DL<=0.005 MIU/L-ACNC: 1.67 UIU/ML (ref 0.27–4.2)
VLDLC SERPL CALC-MCNC: 23 MG/DL
WBC # BLD AUTO: 4.39 10*3/MM3 (ref 3.4–10.8)

## 2020-05-19 PROCEDURE — G0439 PPPS, SUBSEQ VISIT: HCPCS | Performed by: NURSE PRACTITIONER

## 2020-05-19 PROCEDURE — 99214 OFFICE O/P EST MOD 30 MIN: CPT | Performed by: NURSE PRACTITIONER

## 2020-05-19 NOTE — PROGRESS NOTES
The ABCs of the Annual Wellness Visit  Subsequent Medicare Wellness Visit    Chief Complaint   Patient presents with   • Medicare Wellness-subsequent   • Hypertension   • Hypothyroidism   • Constipation       Subjective   History of Present Illness:  Tammy Torres is a 69 y.o. female who presents for a Subsequent Medicare Wellness Visit.    HEALTH RISK ASSESSMENT    Recent Hospitalizations:  No hospitalization(s) within the last year.    Current Medical Providers:  Patient Care Team:  Hina Rosen APRN as PCP - General (Internal Medicine)    Smoking Status:  Social History     Tobacco Use   Smoking Status Never Smoker   Smokeless Tobacco Never Used       Alcohol Consumption:  Social History     Substance and Sexual Activity   Alcohol Use Yes    Comment: Occcasional       Depression Screen:   PHQ-2/PHQ-9 Depression Screening 5/19/2020   Little interest or pleasure in doing things 0   Feeling down, depressed, or hopeless 1   Trouble falling or staying asleep, or sleeping too much 0   Feeling tired or having little energy 0   Poor appetite or overeating 0   Feeling bad about yourself - or that you are a failure or have let yourself or your family down 0   Trouble concentrating on things, such as reading the newspaper or watching television 0   Moving or speaking so slowly that other people could have noticed. Or the opposite - being so fidgety or restless that you have been moving around a lot more than usual 0   Thoughts that you would be better off dead, or of hurting yourself in some way 0   Total Score 1   If you checked off any problems, how difficult have these problems made it for you to do your work, take care of things at home, or get along with other people? Not difficult at all       Fall Risk Screen:  STEADI Fall Risk Assessment was completed, and patient is at LOW risk for falls.Assessment completed on:5/19/2020    Health Habits and Functional and Cognitive Screening:  Functional & Cognitive Status  5/19/2020   Do you have difficulty preparing food and eating? No   Do you have difficulty bathing yourself, getting dressed or grooming yourself? No   Do you have difficulty using the toilet? No   Do you have difficulty moving around from place to place? No   Do you have trouble with steps or getting out of a bed or a chair? No   Current Diet Well Balanced Diet   Dental Exam Up to date   Eye Exam Up to date   Exercise (times per week) 4 times per week   Current Exercise Activities Include Walking   Do you need help using the phone?  No   Are you deaf or do you have serious difficulty hearing?  No   Do you need help with transportation? No   Do you need help shopping? No   Do you need help preparing meals?  No   Do you need help with housework?  No   Do you need help with laundry? No   Do you need help taking your medications? No   Do you need help managing money? No   Do you ever drive or ride in a car without wearing a seat belt? No   Have you felt unusual stress, anger or loneliness in the last month? No   Who do you live with? Spouse   If you need help, do you have trouble finding someone available to you? No   Have you been bothered in the last four weeks by sexual problems? No   Do you have difficulty concentrating, remembering or making decisions? No         Does the patient have evidence of cognitive impairment? No    Asprin use counseling:Does not need ASA (and currently is not on it)    Age-appropriate Screening Schedule:  Refer to the list below for future screening recommendations based on patient's age, sex and/or medical conditions. Orders for these recommended tests are listed in the plan section. The patient has been provided with a written plan.    Health Maintenance   Topic Date Due   • DXA SCAN  01/16/2020   • ZOSTER VACCINE (1 of 2) 05/25/2020 (Originally 7/5/2000)   • TDAP/TD VACCINES (1 - Tdap) 05/20/2022 (Originally 7/5/1961)   • INFLUENZA VACCINE  08/01/2020   • LIPID PANEL  05/19/2021   •  MAMMOGRAM  06/20/2021          The following portions of the patient's history were reviewed and updated as appropriate: allergies, current medications, past family history, past medical history, past social history, past surgical history and problem list.    Outpatient Medications Prior to Visit   Medication Sig Dispense Refill   • BIOTIN PO Take  by mouth.     • cetirizine (zyrTEC) 10 MG tablet Take 10 mg by mouth Daily.     • fluticasone (FLONASE SENSIMIST) 27.5 MCG/SPRAY nasal spray 2 sprays into the nostril(s) as directed by provider Daily. 5.9 mL 0   • levothyroxine (SYNTHROID, LEVOTHROID) 75 MCG tablet TAKE 1 TABLET BY MOUTH DAILY 90 tablet 0   • lisinopril (PRINIVIL,ZESTRIL) 5 MG tablet TAKE 1 TABLET BY MOUTH DAILY 30 tablet 3   • meclizine (ANTIVERT) 25 MG tablet Take 1 tablet by mouth 3 (Three) Times a Day As Needed for dizziness or Nausea. 30 tablet 0   • Multiple Vitamin (MULTI VITAMIN DAILY PO) Take  by mouth.     • polyethylene glycol (MIRALAX) 17 g packet Take 17 g by mouth Daily. 1 each 0   • QUEtiapine (SEROquel) 25 MG tablet TAKE 1 TABLET BY MOUTH AT NIGHT AS NEEDED FOR INSOMNIA 30 tablet 3   • TURMERIC PO Take  by mouth.       No facility-administered medications prior to visit.        Patient Active Problem List   Diagnosis   • Depression   • Essential hypertension   • Acquired hypothyroidism   • Insomnia       Advanced Care Planning:  ACP discussion was held with the patient during this visit. Patient does not have an advance directive, information provided.    Review of Systems    Compared to one year ago, the patient feels her physical health is the same.  Compared to one year ago, the patient feels her mental health is the same.    Reviewed chart for potential of high risk medication in the elderly: yes  Reviewed chart for potential of harmful drug interactions in the elderly:yes    Objective         Vitals:    05/19/20 0926   BP: 126/80   BP Location: Left arm   Patient Position: Sitting  "  Cuff Size: Adult   Pulse: 61   Temp: 97.9 °F (36.6 °C)   TempSrc: Oral   SpO2: 98%   Weight: 66.2 kg (146 lb)   Height: 167.6 cm (66\")   PainSc:   2   PainLoc: Generalized       Body mass index is 23.57 kg/m².  Discussed the patient's BMI with her. The BMI is above average; BMI management plan is completed.    Physical Exam    Lab Results   Component Value Date     (H) 05/19/2020    CHLPL 248 (H) 05/19/2020    TRIG 115 05/19/2020    HDL 62 (H) 05/19/2020     (H) 05/19/2020    VLDL 23 05/19/2020        Assessment/Plan   Medicare Risks and Personalized Health Plan  CMS Preventative Services Quick Reference  Cardiovascular risk  Immunizations Discussed/Encouraged (specific immunizations; adacel Tdap, Influenza, Pneumococcal 23, Prevnar and Shingrix )  She has declined all immunizations.    The above risks/problems have been discussed with the patient.  Pertinent information has been shared with the patient in the After Visit Summary.  Follow up plans and orders are seen below in the Assessment/Plan Section.    Diagnoses and all orders for this visit:    1. Essential hypertension (Primary)  -     CBC & Differential  -     Comprehensive Metabolic Panel  -     Lipid Panel    2. Acquired hypothyroidism  -     TSH    3. Insomnia, unspecified type    4. Constipation, unspecified constipation type      Follow Up:  Return in about 6 months (around 11/19/2020).     An After Visit Summary and PPPS were given to the patient.             Answers for HPI/ROS submitted by the patient on 5/15/2020   What is the primary reason for your visit?: Other  Please describe your symptoms.: This is a regularly scheduled check up and a follow up for my constipation issue  Have you had these symptoms before?: No  How long have you been having these symptoms?: 1-2 weeks  Please list any medications you are currently taking for this condition.: She put me on miralax and it is working  Please describe any probable cause for these " symptoms. : We discussed these already

## 2020-05-20 PROBLEM — G47.00 INSOMNIA: Status: ACTIVE | Noted: 2020-05-20

## 2020-05-20 PROBLEM — I10 ESSENTIAL HYPERTENSION: Status: ACTIVE | Noted: 2020-05-20

## 2020-05-20 PROBLEM — E03.9 ACQUIRED HYPOTHYROIDISM: Status: ACTIVE | Noted: 2020-05-20

## 2020-05-20 NOTE — PROGRESS NOTES
Wes Torres is a 69 y.o. female who presents for f/u regarding HTN, hypothyroidism and constipation.    She has started taking Miralax and daily and is now having bowel movements daily (sometimes twice a day). She is feeling better, denies abdominal pain or bloating. She is also slowly increasing her fiber intake which has been helpful.  She continues to take Seroquel nightly for mgmt of insomnia, tolerating well and without side effects.    Hypertension   This is a chronic problem. The current episode started more than 1 year ago. The problem is unchanged. The problem is controlled. Pertinent negatives include no chest pain, headaches, malaise/fatigue, palpitations, peripheral edema or shortness of breath. Current antihypertension treatment includes ACE inhibitors. The current treatment provides significant improvement. There are no compliance problems.    Hypothyroidism   This is a chronic problem. The current episode started more than 1 year ago. The problem occurs daily. The problem has been unchanged. Associated symptoms include congestion. Pertinent negatives include no abdominal pain, arthralgias, chest pain, chills, coughing, fatigue, fever, headaches, joint swelling, nausea, sore throat, vomiting or weakness.        The following portions of the patient's history were reviewed and updated as appropriate: allergies, current medications, past social history and problem list.    Past Medical History:   Diagnosis Date   • Depression    • Essential hypertension 5/20/2020   • Hyperlipidemia    • Hypothyroidism    • Insomnia    • Mood disorder (CMS/HCC)    • Osteopenia          Current Outpatient Medications:   •  BIOTIN PO, Take  by mouth., Disp: , Rfl:   •  cetirizine (zyrTEC) 10 MG tablet, Take 10 mg by mouth Daily., Disp: , Rfl:   •  fluticasone (FLONASE SENSIMIST) 27.5 MCG/SPRAY nasal spray, 2 sprays into the nostril(s) as directed by provider Daily., Disp: 5.9 mL, Rfl: 0  •  levothyroxine  (SYNTHROID, LEVOTHROID) 75 MCG tablet, TAKE 1 TABLET BY MOUTH DAILY, Disp: 90 tablet, Rfl: 0  •  lisinopril (PRINIVIL,ZESTRIL) 5 MG tablet, TAKE 1 TABLET BY MOUTH DAILY, Disp: 30 tablet, Rfl: 3  •  meclizine (ANTIVERT) 25 MG tablet, Take 1 tablet by mouth 3 (Three) Times a Day As Needed for dizziness or Nausea., Disp: 30 tablet, Rfl: 0  •  Multiple Vitamin (MULTI VITAMIN DAILY PO), Take  by mouth., Disp: , Rfl:   •  polyethylene glycol (MIRALAX) 17 g packet, Take 17 g by mouth Daily., Disp: 1 each, Rfl: 0  •  QUEtiapine (SEROquel) 25 MG tablet, TAKE 1 TABLET BY MOUTH AT NIGHT AS NEEDED FOR INSOMNIA, Disp: 30 tablet, Rfl: 3  •  TURMERIC PO, Take  by mouth., Disp: , Rfl:     Allergies   Allergen Reactions   • Atorvastatin Myalgia   • Hydrocodone Nausea And Vomiting   • Oxycodone Nausea And Vomiting   • Pitavastatin Myalgia   • Codeine GI Intolerance and Nausea And Vomiting       Review of Systems   Constitutional: Negative for chills, fatigue, fever, malaise/fatigue and unexpected weight change.   HENT: Positive for congestion and postnasal drip. Negative for ear pain, sinus pressure, sore throat and trouble swallowing.    Eyes: Negative for visual disturbance.   Respiratory: Negative for cough, chest tightness, shortness of breath and wheezing.    Cardiovascular: Negative for chest pain, palpitations and leg swelling.   Gastrointestinal: Positive for constipation. Negative for abdominal pain, blood in stool, nausea and vomiting.   Genitourinary: Negative for dysuria, frequency and urgency.   Musculoskeletal: Negative for arthralgias and joint swelling.   Skin: Negative for color change.   Neurological: Negative for syncope, weakness and headaches.   Hematological: Does not bruise/bleed easily.   Psychiatric/Behavioral: Positive for sleep disturbance (improved with Seroquel).       Objective   Vitals:    05/19/20 0926   BP: 126/80   BP Location: Left arm   Patient Position: Sitting   Cuff Size: Adult   Pulse: 61  "  Temp: 97.9 °F (36.6 °C)   TempSrc: Oral   SpO2: 98%   Weight: 66.2 kg (146 lb)   Height: 167.6 cm (66\")     Body mass index is 23.57 kg/m².  Physical Exam   Constitutional: She appears well-developed and well-nourished. She is cooperative. She does not have a sickly appearance. She does not appear ill.   HENT:   Head: Normocephalic.   Right Ear: Hearing, tympanic membrane and external ear normal.   Left Ear: Hearing, tympanic membrane and external ear normal.   Nose: Nose normal. No mucosal edema, rhinorrhea, sinus tenderness or nasal deformity. Right sinus exhibits no maxillary sinus tenderness and no frontal sinus tenderness. Left sinus exhibits no maxillary sinus tenderness and no frontal sinus tenderness.   Mouth/Throat: Oropharynx is clear and moist and mucous membranes are normal. Normal dentition.   Eyes: Conjunctivae and lids are normal. Right eye exhibits no discharge and no exudate. Left eye exhibits no discharge and no exudate.   Neck: Trachea normal. Carotid bruit is not present. No edema present. No thyroid mass present.   Cardiovascular: Regular rhythm, normal heart sounds and normal pulses.   No murmur heard.  Pulmonary/Chest: Breath sounds normal. No respiratory distress. She has no decreased breath sounds. She has no wheezes. She has no rhonchi. She has no rales.   Abdominal: Soft. There is no tenderness.   Lymphadenopathy:        Head (right side): No submental, no submandibular, no tonsillar, no preauricular, no posterior auricular and no occipital adenopathy present.        Head (left side): No submental, no submandibular, no tonsillar, no preauricular, no posterior auricular and no occipital adenopathy present.   Neurological: She is alert.   Skin: Skin is warm, dry and intact. No cyanosis. Nails show no clubbing.       Assessment/Plan   Tammy was seen today for medicare wellness-subsequent, hypertension, hypothyroidism and constipation.    Diagnoses and all orders for this visit:    Essential " hypertension  -     CBC & Differential  -     Comprehensive Metabolic Panel  -     Lipid Panel    Acquired hypothyroidism  -     TSH    Insomnia, unspecified type    Constipation, unspecified constipation type    1. BP is well-controlled on current medications.  2. Recheck TSH, especially with c/o constipation.  3. Sx improved with daily Seroquel.  4. We discussed decreasing Miralax to every other day and to restart fiber supplement (has stopped) as well as dietary supplement. ColoGuard performed 1/2019, continue to monitor.           Answers for HPI/ROS submitted by the patient on 5/15/2020   What is the primary reason for your visit?: Other  Please describe your symptoms.: This is a regularly scheduled check up and a follow up for my constipation issue  Have you had these symptoms before?: No  How long have you been having these symptoms?: 1-2 weeks  Please list any medications you are currently taking for this condition.: She put me on miralax and it is working  Please describe any probable cause for these symptoms. : We discussed these already

## 2020-05-20 NOTE — PATIENT INSTRUCTIONS
Medicare Wellness  Personal Prevention Plan of Service     Date of Office Visit:  2020  Encounter Provider:  MARILOU Alston  Place of Service:  [facility] INTERNAL MEDICINE  Patient Name: Tammy Torres  :  1950    As part of the Medicare Wellness portion of your visit today, we are providing you with this personalized preventive plan of services (PPPS). This plan is based upon recommendations of the [place] Preventive Services Task Force (USPSTF) and the Advisory Committee on Immunization Practices (ACIP).    This lists the preventive care services that should be considered, and provides dates of when you are due. Items listed as completed are up-to-date and do not require any further intervention.    Health Maintenance   Topic Date Due   • DXA SCAN  2020   • MEDICARE ANNUAL WELLNESS  05/15/2020   • ZOSTER VACCINE (1 of 2) 2020 (Originally 2000)   • TDAP/TD VACCINES (1 - Tdap) 2022 (Originally 1961)   • Pneumococcal Vaccine Once at 65 Years Old  2022 (Originally 2015)   • INFLUENZA VACCINE  2020   • LIPID PANEL  2021   • MAMMOGRAM  2021   • COLOGUARD  2022   • HEPATITIS C SCREENING  Completed       Orders Placed This Encounter   Procedures   • Comprehensive Metabolic Panel     Order Specific Question:   [facility] Has the patient fasted?     Answer:   No   • Lipid Panel     Order Specific Question:   [facility] Has the patient fasted?     Answer:   No   • TSH     Order Specific Question:   [facility] Has the patient fasted?     Answer:   No   • CBC & Differential     Order Specific Question:   Manual Differential     Answer:   No     Order Specific Question:   [facility] Has the patient fasted?     Answer:   No       Return in about 6 months (around 2020).

## 2020-05-28 DIAGNOSIS — G47.00 INSOMNIA, UNSPECIFIED TYPE: ICD-10-CM

## 2020-05-29 RX ORDER — QUETIAPINE FUMARATE 25 MG/1
TABLET, FILM COATED ORAL
Qty: 30 TABLET | Refills: 3 | Status: SHIPPED | OUTPATIENT
Start: 2020-05-29 | End: 2020-09-25

## 2020-06-17 ENCOUNTER — TELEPHONE (OUTPATIENT)
Dept: INTERNAL MEDICINE | Facility: CLINIC | Age: 70
End: 2020-06-17

## 2020-06-17 DIAGNOSIS — F32.9 REACTIVE DEPRESSION: Primary | ICD-10-CM

## 2020-06-17 RX ORDER — FLUOXETINE HYDROCHLORIDE 20 MG/1
20 CAPSULE ORAL DAILY
Qty: 90 CAPSULE | Refills: 1 | Status: SHIPPED | OUTPATIENT
Start: 2020-06-17 | End: 2020-06-23 | Stop reason: SINTOL

## 2020-06-17 NOTE — TELEPHONE ENCOUNTER
Please notify pt I have sent Fluoxetine to [facility]. F/u in 6-8 weeks if no improvement in sx or sooner if sx worsen. Thanks.

## 2020-06-17 NOTE — TELEPHONE ENCOUNTER
PATIENT CALLING IN TO REQUEST A MEDICATION FOR PREVIOUS DEPRESSION DIAGNOSIS.  SHE STATES SHE HAS HAD AN EPISODE LASTING THE LAST MONTH AND WOULD LIKE TO GO BACK ON AN ANTI DEPRESSANT MEDICATION.    SHE USED PROZAC IN THE PAST AND WOULD LIKE TO DO THAT AGAIN OR SOMETHING REMI ALMANZAR RECOMMENDS.    PLEASE CALL AND ADVISE [phone]

## 2020-06-23 DIAGNOSIS — F32.9 REACTIVE DEPRESSION: Primary | ICD-10-CM

## 2020-08-09 DIAGNOSIS — E03.9 HYPOTHYROIDISM, UNSPECIFIED TYPE: ICD-10-CM

## 2020-08-10 RX ORDER — LEVOTHYROXINE SODIUM 0.07 MG/1
75 TABLET ORAL DAILY
Qty: 90 TABLET | Refills: 1 | Status: SHIPPED | OUTPATIENT
Start: 2020-08-10 | End: 2021-02-06

## 2020-08-27 DIAGNOSIS — I10 ESSENTIAL HYPERTENSION: ICD-10-CM

## 2020-08-27 RX ORDER — LISINOPRIL 5 MG/1
5 TABLET ORAL DAILY
Qty: 90 TABLET | Refills: 1 | Status: SHIPPED | OUTPATIENT
Start: 2020-08-27 | End: 2021-02-23

## 2020-09-25 DIAGNOSIS — G47.00 INSOMNIA, UNSPECIFIED TYPE: ICD-10-CM

## 2020-09-25 RX ORDER — QUETIAPINE FUMARATE 25 MG/1
TABLET, FILM COATED ORAL
Qty: 30 TABLET | Refills: 3 | Status: SHIPPED | OUTPATIENT
Start: 2020-09-25 | End: 2021-02-22

## 2020-11-19 ENCOUNTER — OFFICE VISIT (OUTPATIENT)
Dept: INTERNAL MEDICINE | Facility: CLINIC | Age: 70
End: 2020-11-19

## 2020-11-19 VITALS
BODY MASS INDEX: 23.63 KG/M2 | HEIGHT: 66 IN | OXYGEN SATURATION: 98 % | SYSTOLIC BLOOD PRESSURE: 122 MMHG | DIASTOLIC BLOOD PRESSURE: 80 MMHG | WEIGHT: 147 LBS | HEART RATE: 61 BPM | TEMPERATURE: 98.1 F

## 2020-11-19 DIAGNOSIS — Z23 NEED FOR INFLUENZA VACCINATION: ICD-10-CM

## 2020-11-19 DIAGNOSIS — F32.9 REACTIVE DEPRESSION: ICD-10-CM

## 2020-11-19 DIAGNOSIS — I10 ESSENTIAL HYPERTENSION: Primary | ICD-10-CM

## 2020-11-19 DIAGNOSIS — L71.9 ROSACEA: ICD-10-CM

## 2020-11-19 DIAGNOSIS — E03.9 ACQUIRED HYPOTHYROIDISM: ICD-10-CM

## 2020-11-19 PROCEDURE — 99214 OFFICE O/P EST MOD 30 MIN: CPT | Performed by: NURSE PRACTITIONER

## 2020-11-19 PROCEDURE — G0008 ADMIN INFLUENZA VIRUS VAC: HCPCS | Performed by: NURSE PRACTITIONER

## 2020-11-19 PROCEDURE — 90694 VACC AIIV4 NO PRSRV 0.5ML IM: CPT | Performed by: NURSE PRACTITIONER

## 2020-11-22 PROBLEM — L71.9 ROSACEA: Status: ACTIVE | Noted: 2020-11-22

## 2020-11-22 NOTE — PROGRESS NOTES
Subjective   Tammy Torres is a 70 y.o. female who presents for f/u regarding HTN, hypothyroidism and depression.    She is tolerating Sertraline well and feels medication has been helpful with decreased anxiety and lethargy, denies side effect from medication. She is using Seroquel at night for improved sleep.  Her dermatologist prescribed Metronidazole for rosacea, Dr. Weaver changed to oral Doxycycline after recent surgery for ptosis. He noticed significant irritation of the capillaries consistent with rosacea.      Hypertension  This is a chronic problem. The current episode started more than 1 year ago. The problem is unchanged. The problem is controlled. Pertinent negatives include no chest pain, headaches, palpitations, peripheral edema or shortness of breath. Current antihypertension treatment includes ACE inhibitors. The current treatment provides significant improvement. There are no compliance problems.    Hypothyroidism  Associated symptoms include congestion and a rash. Pertinent negatives include no abdominal pain, arthralgias, chest pain, chills, coughing, fatigue, fever, headaches, joint swelling, nausea, sore throat, vomiting or weakness.   DepressionPatient presents with the following symptoms: nervousness/anxiety.  Patient is not experiencing: palpitations and shortness of breath.         The following portions of the patient's history were reviewed and updated as appropriate: allergies, current medications, past social history and problem list.    Past Medical History:   Diagnosis Date   • Depression    • Essential hypertension 5/20/2020   • Hyperlipidemia    • Hypothyroidism    • Insomnia    • Mood disorder (CMS/[facility])    • Osteopenia          Current Outpatient Medications:   •  BIOTIN PO, Take  by mouth., Disp: , Rfl:   •  cetirizine (zyrTEC) 10 MG tablet, Take 10 mg by mouth Daily., Disp: , Rfl:   •  fluticasone (FLONASE SENSIMIST) 27.5 MCG/SPRAY nasal spray, 2 sprays into the nostril(s) as  directed by provider Daily., Disp: 5.9 mL, Rfl: 0  •  levothyroxine (SYNTHROID, LEVOTHROID) 75 MCG tablet, TAKE 1 TABLET BY MOUTH DAILY, Disp: 90 tablet, Rfl: 1  •  lisinopril (PRINIVIL,ZESTRIL) 5 MG tablet, TAKE 1 TABLET BY MOUTH DAILY, Disp: 90 tablet, Rfl: 1  •  meclizine (ANTIVERT) 25 MG tablet, Take 1 tablet by mouth 3 (Three) Times a Day As Needed for dizziness or Nausea., Disp: 30 tablet, Rfl: 0  •  Multiple Vitamin (MULTI VITAMIN DAILY PO), Take  by mouth., Disp: , Rfl:   •  polyethylene glycol (MIRALAX) 17 g packet, Take 17 g by mouth Daily., Disp: 1 each, Rfl: 0  •  QUEtiapine (SEROquel) 25 MG tablet, TAKE 1 TABLET BY MOUTH AT NIGHT AS NEEDED FOR INSOMNIA, Disp: 30 tablet, Rfl: 3  •  sertraline (ZOLOFT) 50 MG tablet, Take 1 tablet by mouth Daily. 1/2 tablet x7 days then 1 tablet daily, Disp: 30 tablet, Rfl: 5  •  TURMERIC PO, Take  by mouth., Disp: , Rfl:     Allergies   Allergen Reactions   • Atorvastatin Myalgia   • Hydrocodone Nausea And Vomiting   • Oxycodone Nausea And Vomiting   • Pitavastatin Myalgia   • Codeine GI Intolerance and Nausea And Vomiting       Review of Systems   Constitutional: Negative for chills, fatigue, fever and unexpected weight change.   HENT: Positive for congestion and postnasal drip. Negative for ear pain, sinus pressure, sore throat and trouble swallowing.    Eyes: Negative for visual disturbance.   Respiratory: Negative for cough, chest tightness, shortness of breath and wheezing.    Cardiovascular: Negative for chest pain, palpitations and leg swelling.   Gastrointestinal: Negative for abdominal pain, blood in stool, nausea and vomiting.   Genitourinary: Negative for dysuria, frequency and urgency.   Musculoskeletal: Negative for arthralgias and joint swelling.   Skin: Positive for rash. Negative for color change.   Neurological: Negative for syncope, weakness and headaches.   Hematological: Does not bruise/bleed easily.   Psychiatric/Behavioral: Positive for dysphoric  "mood (improved with Sertraline). The patient is nervous/anxious.        Objective   Vitals:    11/19/20 0943   BP: 122/80   BP Location: Left arm   Patient Position: Sitting   Cuff Size: Adult   Pulse: 61   Temp: 98.1 °F (36.7 °C)   TempSrc: Oral   SpO2: 98%   Weight: 66.7 kg (147 lb)   Height: 167.6 cm (66\")     Body mass index is 23.73 kg/m².  Physical Exam  Constitutional:       Appearance: She is well-developed. She is not ill-appearing.   HENT:      Head: Normocephalic.      Right Ear: Hearing, tympanic membrane and external ear normal.      Left Ear: Hearing, tympanic membrane and external ear normal.      Nose: Nose normal. No nasal deformity, mucosal edema or rhinorrhea.      Right Sinus: No maxillary sinus tenderness or frontal sinus tenderness.      Left Sinus: No maxillary sinus tenderness or frontal sinus tenderness.      Mouth/Throat:      Dentition: Normal dentition.   Eyes:      General: Lids are normal.         Right eye: No discharge.         Left eye: No discharge.      Conjunctiva/sclera: Conjunctivae normal.      Right eye: No exudate.     Left eye: No exudate.  Neck:      Musculoskeletal: Normal range of motion. No edema.      Thyroid: No thyroid mass or thyromegaly.      Vascular: No carotid bruit.      Trachea: Trachea normal.   Cardiovascular:      Rate and Rhythm: Regular rhythm.      Pulses: Normal pulses.      Heart sounds: Normal heart sounds. No murmur.   Pulmonary:      Effort: No respiratory distress.      Breath sounds: Normal breath sounds. No decreased breath sounds, wheezing, rhonchi or rales.   Abdominal:      General: Bowel sounds are normal.      Palpations: Abdomen is soft.      Tenderness: There is no abdominal tenderness.   Lymphadenopathy:      Head:      Right side of head: No submental, submandibular, tonsillar, preauricular, posterior auricular or occipital adenopathy.      Left side of head: No submental, submandibular, tonsillar, preauricular, posterior auricular or " occipital adenopathy.   Skin:     General: Skin is warm and dry.      Nails: There is no clubbing.     Neurological:      Mental Status: She is alert.   Psychiatric:         Behavior: Behavior is cooperative.         Assessment/Plan   Diagnoses and all orders for this visit:    1. Essential hypertension (Primary)    2. Acquired hypothyroidism    3. Reactive depression  -     sertraline (ZOLOFT) 50 MG tablet; Take 1 tablet by mouth Daily. 1/2 tablet x7 days then 1 tablet daily  Dispense: 30 tablet; Refill: 5    4. Rosacea    5. Need for influenza vaccination  -     Fluad Quad 65+ yrs ([id number])    1. HTN-BP is excellent in the office, continue current medications along with a low-sodium diet.  2. Hypothyroidism-managed on Synthroid, TSH 1.67 with 5/2020 labs.  3. Depression-currently managed on Sertraline.  4. Rosacea-improved with Metronidazole.  She has declined Pneumovax 23 and Shingrix vaccines, flu shot administered today.

## 2020-12-12 DIAGNOSIS — F32.9 REACTIVE DEPRESSION: ICD-10-CM

## 2021-02-06 DIAGNOSIS — E03.9 HYPOTHYROIDISM, UNSPECIFIED TYPE: ICD-10-CM

## 2021-02-06 RX ORDER — LEVOTHYROXINE SODIUM 0.07 MG/1
75 TABLET ORAL DAILY
Qty: 90 TABLET | Refills: 1 | Status: SHIPPED | OUTPATIENT
Start: 2021-02-06 | End: 2021-07-29

## 2021-02-22 DIAGNOSIS — G47.00 INSOMNIA, UNSPECIFIED TYPE: ICD-10-CM

## 2021-02-22 RX ORDER — QUETIAPINE FUMARATE 25 MG/1
TABLET, FILM COATED ORAL
Qty: 30 TABLET | Refills: 3 | Status: SHIPPED | OUTPATIENT
Start: 2021-02-22 | End: 2021-06-22

## 2021-02-23 DIAGNOSIS — I10 ESSENTIAL HYPERTENSION: ICD-10-CM

## 2021-02-23 RX ORDER — LISINOPRIL 5 MG/1
5 TABLET ORAL DAILY
Qty: 90 TABLET | Refills: 1 | Status: SHIPPED | OUTPATIENT
Start: 2021-02-23 | End: 2021-08-16

## 2021-03-09 DIAGNOSIS — Z23 IMMUNIZATION DUE: ICD-10-CM

## 2021-06-03 DIAGNOSIS — F32.9 REACTIVE DEPRESSION: ICD-10-CM

## 2021-06-03 NOTE — TELEPHONE ENCOUNTER
Last OV 11/19/20  No f/u scheduled     I will send the pt a [place] message letting her know she's due for an appt before additional refills

## 2021-06-22 DIAGNOSIS — G47.00 INSOMNIA, UNSPECIFIED TYPE: ICD-10-CM

## 2021-06-24 RX ORDER — QUETIAPINE FUMARATE 25 MG/1
TABLET, FILM COATED ORAL
Qty: 30 TABLET | Refills: 0 | Status: SHIPPED | OUTPATIENT
Start: 2021-06-24 | End: 2021-07-22

## 2021-07-19 DIAGNOSIS — Z78.0 POST-MENOPAUSAL: Primary | ICD-10-CM

## 2021-07-20 ENCOUNTER — OFFICE VISIT (OUTPATIENT)
Dept: INTERNAL MEDICINE | Facility: CLINIC | Age: 71
End: 2021-07-20

## 2021-07-20 ENCOUNTER — CLINICAL SUPPORT (OUTPATIENT)
Dept: INTERNAL MEDICINE | Facility: CLINIC | Age: 71
End: 2021-07-20

## 2021-07-20 VITALS
TEMPERATURE: 98 F | BODY MASS INDEX: 23.95 KG/M2 | HEIGHT: 66 IN | DIASTOLIC BLOOD PRESSURE: 72 MMHG | OXYGEN SATURATION: 97 % | WEIGHT: 149 LBS | HEART RATE: 61 BPM | SYSTOLIC BLOOD PRESSURE: 110 MMHG

## 2021-07-20 DIAGNOSIS — Z78.0 POST-MENOPAUSAL: ICD-10-CM

## 2021-07-20 DIAGNOSIS — I10 ESSENTIAL HYPERTENSION: Primary | Chronic | ICD-10-CM

## 2021-07-20 DIAGNOSIS — E03.9 ACQUIRED HYPOTHYROIDISM: Chronic | ICD-10-CM

## 2021-07-20 DIAGNOSIS — F32.9 REACTIVE DEPRESSION: Chronic | ICD-10-CM

## 2021-07-20 DIAGNOSIS — F51.01 PRIMARY INSOMNIA: Chronic | ICD-10-CM

## 2021-07-20 PROBLEM — G47.00 INSOMNIA: Chronic | Status: ACTIVE | Noted: 2020-05-20

## 2021-07-20 PROCEDURE — 77080 DXA BONE DENSITY AXIAL: CPT | Performed by: NURSE PRACTITIONER

## 2021-07-20 PROCEDURE — 99213 OFFICE O/P EST LOW 20 MIN: CPT | Performed by: NURSE PRACTITIONER

## 2021-07-20 PROCEDURE — 1170F FXNL STATUS ASSESSED: CPT | Performed by: NURSE PRACTITIONER

## 2021-07-20 PROCEDURE — 1159F MED LIST DOCD IN RCRD: CPT | Performed by: NURSE PRACTITIONER

## 2021-07-20 PROCEDURE — G0439 PPPS, SUBSEQ VISIT: HCPCS | Performed by: NURSE PRACTITIONER

## 2021-07-20 PROCEDURE — 1126F AMNT PAIN NOTED NONE PRSNT: CPT | Performed by: NURSE PRACTITIONER

## 2021-07-20 RX ORDER — NICOTINE 14MG/24HR
1 PATCH, TRANSDERMAL 24 HOURS TRANSDERMAL DAILY
Qty: 30 CAPSULE | Refills: 5
Start: 2021-07-20 | End: 2022-04-08

## 2021-07-20 NOTE — PATIENT INSTRUCTIONS
Calcium Content in Foods  Calcium is the most abundant mineral in your body. Most of your body's calcium supply is stored in your bones and teeth. Calcium helps many parts of the body function normally, including:  · Blood and blood vessels.  · Nerves.  · Hormones.  · Muscles.  · Bones and teeth.  When your calcium stores are low, you may be at risk for low bone mass, bone loss, and broken bones (fractures). When you get enough calcium, it helps to support strong bones and teeth throughout your life.  Calcium is especially important for:  · Children during growth spurts.  · Girls during adolescence.  · Women who are pregnant or breastfeeding.  · Women after their menstrual cycle stops (postmenopause).  · Women whose menstrual cycle has stopped due to anorexia nervosa or regular intense exercise.  · People who cannot eat or digest dairy products.  · Vegans.  What are tips for getting more calcium?  General information  · Try to get most of your calcium from food. Eat foods that are high in calcium.  · Some people may benefit from taking calcium supplements. Check with your health care provider or diet and nutrition specialist (dietitian) before starting any calcium supplements. Calcium supplements may interact with certain medicines. Too much calcium may cause other health problems, like constipation and kidney stones.  · For the body to absorb calcium, it needs vitamin D. Sources of vitamin D include:  ? Skin exposure to direct sunlight.  ? Foods, such as egg yolks, liver, saltwater fish, and fortified milk.  ? Vitamin D supplements. Check with your health care provider or dietitian before starting any vitamin D supplements.  What foods are high in calcium?    High-calcium foods are those that contain more than 100 milligrams (mg) of calcium per serving.  Fruits  · Fortified orange or other fruit juice, 300 mg per 8 oz serving.  Vegetables  · Jeffy greens, 360 mg per 8 oz serving.  · Kale, 180 mg per 8 oz  serving.  · Bok johnson, 160 mg per 8 oz serving.  Grains  · Fortified ready-to-eat cereals, 100-1,000 mg per 8 oz serving.  · Fortified frozen waffles, 200 mg in two waffles.  Meats and other proteins  · Sardines, canned with bones, 325 mg per 3 oz serving.  · [place], canned with bones, 180 mg per 3 oz serving.  · Canned shrimp, 125 mg per 3 oz serving.  · Baked beans, 160 mg per 4 oz serving.  Dairy  · Yogurt, plain, low-fat, 310 mg per 6 oz serving.  · Milk, 300 mg per 8 oz serving.  · American cheese, 195 mg per 1 oz serving.  · Cheddar cheese, 205 mg per 1 oz serving.  · Cottage cheese 2%, 105 mg per 4 oz serving.  · Fortified soy, rice, or almond milk, 300 mg per 8 oz serving.  The items listed above may not be a complete list of foods high in calcium. Actual amounts of calcium may be different depending on processing. Contact a dietitian for more information.  What foods are lower in calcium?  Foods lower in calcium are those that contain 50 mg of calcium or less per serving.  Fruits  · Apple, about 6 mg in one apple.  · Banana, about 12 mg in one banana.  Vegetables  · Lettuce, 19 mg per 2 oz serving.  · Tomato, about 11 mg in one tomato.  Grains  · Rice, 4 mg per 6 oz serving.  · Boiled potatoes, 14 mg per 8 oz serving.  · White bread, 6 mg in one slice.  Meats and other proteins  · Egg, 27 mg per 2 oz serving.  · Red meat, 7 mg per 4 oz serving.  · Chicken, 17 mg per 4 oz serving.  · Fish, cod or trout, 20 mg per 4 oz serving.  The items listed above may not be a complete list of foods lower in calcium. Actual amounts of calcium may be different depending on processing. Contact a dietitian for more information.  Summary  · Calcium is an important mineral in the body because it affects many functions. Getting enough calcium helps support strong bones and teeth throughout your life.  · Try to get most of your calcium from food.  · Calcium supplements may interact with certain medicines. Check with your health  care provider before starting any calcium supplements.  This information is not intended to replace advice given to you by your health care provider. Make sure you discuss any questions you have with your health care provider.  Document Revised: 2018 Document Reviewed: 2018  Elsevier Patient Education 2021 [employer]    Medicare Wellness  Personal Prevention Plan of Service     Date of Office Visit:  2021  Encounter Provider:  MARILOU Alston  Place of Service:  [facility]  Patient Name: Tammy Torres  :  1950    As part of the Medicare Wellness portion of your visit today, we are providing you with this personalized preventive plan of services (PPPS). This plan is based upon recommendations of the [place] Preventive Services Task Force (USPSTF) and the Advisory Committee on Immunization Practices (ACIP).    This lists the preventive care services that should be considered, and provides dates of when you are due. Items listed as completed are up-to-date and do not require any further intervention.    Health Maintenance   Topic Date Due   • ANNUAL WELLNESS VISIT  2021   • LIPID PANEL  2021   • MAMMOGRAM  2021   • TDAP/TD VACCINES (1 - Tdap) 2022 (Originally 1969)   • ZOSTER VACCINE (1 of 2) 2025 (Originally 2000)   • Pneumococcal Vaccine 65+ (1 of 1 - PPSV23) 2026 (Originally 2015)   • INFLUENZA VACCINE  10/01/2021   • DXA SCAN  2026   • HEPATITIS C SCREENING  Completed   • COVID-19 Vaccine  Completed   • PAP SMEAR  Discontinued   • COLORECTAL CANCER SCREENING  Discontinued       Orders Placed This Encounter   Procedures   • Comprehensive Metabolic Panel     Order Specific Question:   Release to patient     Answer:   Immediate   • Lipid Panel   • TSH     Order Specific Question:   Release to patient     Answer:   Immediate   • T4, Free     Order Specific Question:   Release to patient      Answer:   Immediate   • CBC & Differential     Order Specific Question:   Manual Differential     Answer:   No       Return in about 6 months (around 1/20/2022).

## 2021-07-20 NOTE — PROGRESS NOTES
The ABCs of the Annual Wellness Visit  Subsequent Medicare Wellness Visit    Chief Complaint   Patient presents with   • Medicare Wellness-subsequent   • Hypertension   • Hypothyroidism   • Depression       Subjective   History of Present Illness:  Tammy Torres is a 71 y.o. female who presents for a Subsequent Medicare Wellness Visit.    HEALTH RISK ASSESSMENT    Recent Hospitalizations:  No hospitalization(s) within the last year.    Current Medical Providers:  Patient Care Team:  Hina Rosen APRN as PCP - General (Internal Medicine)    Smoking Status:  Social History     Tobacco Use   Smoking Status Never Smoker   Smokeless Tobacco Never Used       Alcohol Consumption:  Social History     Substance and Sexual Activity   Alcohol Use Yes    Comment: Occcasional       Depression Screen:   PHQ-2/PHQ-9 Depression Screening 7/20/2021   Little interest or pleasure in doing things 0   Feeling down, depressed, or hopeless 1   Trouble falling or staying asleep, or sleeping too much 0   Feeling tired or having little energy 0   Poor appetite or overeating 0   Feeling bad about yourself - or that you are a failure or have let yourself or your family down 0   Trouble concentrating on things, such as reading the newspaper or watching television 0   Moving or speaking so slowly that other people could have noticed. Or the opposite - being so fidgety or restless that you have been moving around a lot more than usual 0   Thoughts that you would be better off dead, or of hurting yourself in some way 0   Total Score 1   If you checked off any problems, how difficult have these problems made it for you to do your work, take care of things at home, or get along with other people? Not difficult at all   Little interest or pleasure in doing things -   Feeling down, depressed, or hopeless -   Trouble falling or staying asleep, or sleeping too much -   Feeling tired or having little energy -   Poor appetite or overeating -    Feeling bad about yourself - or that you are a failure or have let yourself or your family down -   Trouble concentrating on things, such as reading the newspaper or watching television -   Moving or speaking so slowly that other people could have noticed. Or the opposite - being so fidgety or restless that you have been moving around a lot more than usual -   Thoughts that you would be better off dead, or of hurting yourself in some way -   How difficult have these problems made it for you to do your work, take care of things at home, or get along with other people? -       Fall Risk Screen:  PINKY Fall Risk Assessment was completed, and patient is at LOW risk for falls.Assessment completed on:7/20/2021    Health Habits and Functional and Cognitive Screening:  Functional & Cognitive Status 7/20/2021   Do you have difficulty preparing food and eating? No   Do you have difficulty bathing yourself, getting dressed or grooming yourself? No   Do you have difficulty using the toilet? No   Do you have difficulty moving around from place to place? No   Do you have trouble with steps or getting out of a bed or a chair? No   Current Diet Well Balanced Diet   Dental Exam Up to date   Eye Exam Up to date   Exercise (times per week) 2 times per week   Current Exercises Include Walking   Current Exercise Activities Include -   Do you need help using the phone?  No   Are you deaf or do you have serious difficulty hearing?  No   Do you need help with transportation? No   Do you need help shopping? No   Do you need help preparing meals?  No   Do you need help with housework?  No   Do you need help with laundry? No   Do you need help taking your medications? No   Do you need help managing money? No   Do you ever drive or ride in a car without wearing a seat belt? No   Have you felt unusual stress, anger or loneliness in the last month? No   Who do you live with? Spouse   If you need help, do you have trouble finding someone  available to you? No   Have you been bothered in the last four weeks by sexual problems? No   Do you have difficulty concentrating, remembering or making decisions? No         Does the patient have evidence of cognitive impairment? No    Asprin use counseling:Does not need ASA (and currently is not on it)    Age-appropriate Screening Schedule:  Refer to the list below for future screening recommendations based on patient's age, sex and/or medical conditions. Orders for these recommended tests are listed in the plan section. The patient has been provided with a written plan.    Health Maintenance   Topic Date Due   • LIPID PANEL  05/19/2021   • MAMMOGRAM  06/20/2021   • TDAP/TD VACCINES (1 - Tdap) 05/20/2022 (Originally 7/5/1969)   • ZOSTER VACCINE (1 of 2) 11/22/2025 (Originally 7/5/2000)   • INFLUENZA VACCINE  10/01/2021   • DXA SCAN  07/20/2026   • PAP SMEAR  Discontinued          The following portions of the patient's history were reviewed and updated as appropriate: allergies, current medications, past family history, past medical history, past social history, past surgical history and problem list.    Outpatient Medications Prior to Visit   Medication Sig Dispense Refill   • BIOTIN PO Take  by mouth.     • cetirizine (zyrTEC) 10 MG tablet Take 10 mg by mouth Daily.     • COLLAGEN PO Take  by mouth.     • fluticasone (FLONASE SENSIMIST) 27.5 MCG/SPRAY nasal spray 2 sprays into the nostril(s) as directed by provider Daily. 5.9 mL 0   • levothyroxine (SYNTHROID, LEVOTHROID) 75 MCG tablet TAKE 1 TABLET BY MOUTH DAILY 90 tablet 1   • lisinopril (PRINIVIL,ZESTRIL) 5 MG tablet TAKE 1 TABLET BY MOUTH DAILY 90 tablet 1   • meclizine (ANTIVERT) 25 MG tablet Take 1 tablet by mouth 3 (Three) Times a Day As Needed for dizziness or Nausea. 30 tablet 0   • Multiple Vitamin (MULTI VITAMIN DAILY PO) Take  by mouth.     • polyethylene glycol (MIRALAX) 17 g packet Take 17 g by mouth Daily. 1 each 0   • QUEtiapine (SEROquel) 25  "MG tablet TAKE 1 TABLET BY MOUTH AT NIGHT AS NEEDED FOR INSOMNIA 30 tablet 0   • sertraline (ZOLOFT) 50 MG tablet TAKE 1 AND 1/2 TABLETS BY MOUTH DAILY 45 tablet 1   • TURMERIC PO Take  by mouth.       No facility-administered medications prior to visit.       Patient Active Problem List   Diagnosis   • Depression   • Essential hypertension   • Acquired hypothyroidism   • Insomnia   • Rosacea       Advanced Care Planning:  ACP discussion was held with the patient during this visit. Patient has an advance directive (not in EMR), copy requested.    Review of Systems    Compared to one year ago, the patient feels her physical health is the same.  Compared to one year ago, the patient feels her mental health is the same.    Reviewed chart for potential of high risk medication in the elderly: yes  Reviewed chart for potential of harmful drug interactions in the elderly:yes    Objective         Vitals:    07/20/21 1142   BP: 110/72   BP Location: Left arm   Patient Position: Sitting   Cuff Size: Adult   Pulse: 61   Temp: 98 °F (36.7 °C)   TempSrc: Temporal   SpO2: 97%   Weight: 67.6 kg (149 lb)   Height: 167.6 cm (66\")   PainSc: 0-No pain       Body mass index is 24.05 kg/m².  Discussed the patient's BMI with her. The BMI is in the acceptable range.    Physical Exam          Assessment/Plan   Medicare Risks and Personalized Health Plan  CMS Preventative Services Quick Reference  Breast Cancer/Mammogram Screening  Immunizations Discussed/Encouraged (specific immunizations; Pneumococcal 23 and Shingrix )    The above risks/problems have been discussed with the patient.  Pertinent information has been shared with the patient in the After Visit Summary.  Follow up plans and orders are seen below in the Assessment/Plan Section.    Diagnoses and all orders for this visit:    1. Essential hypertension (Primary)  Assessment & Plan:  Hypertension is unchanged.  Continue current treatment regimen.  Dietary sodium " restriction.  Blood pressure will be reassessed at the next regular appointment.  She will continue lisinopril which she is tolerating well.    Orders:  -     CBC & Differential  -     Comprehensive Metabolic Panel  -     Lipid Panel    2. Acquired hypothyroidism  Assessment & Plan:  She is currently managed on Synthroid, recheck TSH today.    Orders:  -     TSH  -     T4, Free    3. Reactive depression  Assessment & Plan:  She has done very well since starting sertraline which was increased to 75 mg 12/2020.  She states medication has been very helpful and would like to continue.      4. Primary insomnia  Assessment & Plan:  Symptoms controlled with nightly Seroquel which she is tolerating well.      Other orders  -     Saccharomyces boulardii (Probiotic) 250 MG capsule; Take 250 mg by mouth Daily.  Dispense: 30 capsule; Refill: 5    Follow Up:  Return in about 6 months (around 1/20/2022).     An After Visit Summary and PPPS were given to the patient.

## 2021-07-21 PROBLEM — L71.9 ROSACEA: Chronic | Status: ACTIVE | Noted: 2020-11-22

## 2021-07-21 NOTE — ASSESSMENT & PLAN NOTE
Hypertension is unchanged.  Continue current treatment regimen.  Dietary sodium restriction.  Blood pressure will be reassessed at the next regular appointment.  She will continue lisinopril which she is tolerating well.

## 2021-07-21 NOTE — PROGRESS NOTES
"Chief Complaint  Medicare Wellness-subsequent, Hypertension, Hypothyroidism, and Depression    Subjective          Tammy Torres presents to [facility] for f/u regarding HTN, hypothyroidism and depression.    She reports feeling well with good blood pressure control on current medications.  She denies chest pain or shortness of breath.  She is sleeping very well with nightly Seroquel and reports feeling well the next day.  Her most recent labs (from over a year ago) showed mildly decreased kidney function which we are going to repeat today.      Objective   Vital Signs:   /72 (BP Location: Left arm, Patient Position: Sitting, Cuff Size: Adult)   Pulse 61   Temp 98 °F (36.7 °C) (Temporal)   Ht 167.6 cm (66\")   Wt 67.6 kg (149 lb)   SpO2 97%   BMI 24.05 kg/m²     Physical Exam  Constitutional:       Appearance: She is well-developed. She is not ill-appearing.   HENT:      Head: Normocephalic.      Right Ear: Hearing, tympanic membrane and external ear normal.      Left Ear: Hearing, tympanic membrane and external ear normal.      Nose: Nose normal. No nasal deformity, mucosal edema or rhinorrhea.      Right Sinus: No maxillary sinus tenderness or frontal sinus tenderness.      Left Sinus: No maxillary sinus tenderness or frontal sinus tenderness.      Mouth/Throat:      Dentition: Normal dentition.   Eyes:      General: Lids are normal.         Right eye: No discharge.         Left eye: No discharge.      Conjunctiva/sclera: Conjunctivae normal.      Right eye: No exudate.     Left eye: No exudate.  Neck:      Thyroid: No thyroid mass or thyromegaly.      Vascular: No carotid bruit.      Trachea: Trachea normal.   Cardiovascular:      Rate and Rhythm: Regular rhythm.      Pulses: Normal pulses.      Heart sounds: Normal heart sounds. No murmur heard.     Pulmonary:      Effort: No respiratory distress.      Breath sounds: Normal breath sounds. No decreased breath sounds, " wheezing, rhonchi or rales.   Abdominal:      General: Bowel sounds are normal.      Palpations: Abdomen is soft.      Tenderness: There is no abdominal tenderness.   Musculoskeletal:      Cervical back: Normal range of motion. No edema.   Lymphadenopathy:      Head:      Right side of head: No submental, submandibular, tonsillar, preauricular, posterior auricular or occipital adenopathy.      Left side of head: No submental, submandibular, tonsillar, preauricular, posterior auricular or occipital adenopathy.   Skin:     General: Skin is warm and dry.      Nails: There is no clubbing.   Neurological:      Mental Status: She is alert.   Psychiatric:         Behavior: Behavior is cooperative.        Result Review :   The following data was reviewed by: MARILOU Alston on 07/20/2021:      Data reviewed: Radiologic studies Reviewed bone density scan from today which is good.          Assessment and Plan    Diagnoses and all orders for this visit:    1. Essential hypertension (Primary)  Assessment & Plan:  Hypertension is unchanged.  Continue current treatment regimen.  Dietary sodium restriction.  Blood pressure will be reassessed at the next regular appointment.  She will continue lisinopril which she is tolerating well.    Orders:  -     CBC & Differential  -     Comprehensive Metabolic Panel  -     Lipid Panel    2. Acquired hypothyroidism  Assessment & Plan:  She is currently managed on Synthroid, recheck TSH today.    Orders:  -     TSH  -     T4, Free    3. Reactive depression  Assessment & Plan:  She has done very well since starting sertraline which was increased to 75 mg 12/2020.  She states medication has been very helpful and would like to continue.      4. Primary insomnia  Assessment & Plan:  Symptoms controlled with nightly Seroquel which she is tolerating well.      Other orders  -     Saccharomyces boulardii (Probiotic) 250 MG capsule; Take 250 mg by mouth Daily.  Dispense: 30 capsule; Refill:  5      Follow Up   Return in about 6 months (around 1/20/2022).  Patient was given instructions and counseling regarding her condition or for health maintenance advice. Please see specific information pulled into the AVS if appropriate.

## 2021-07-21 NOTE — ASSESSMENT & PLAN NOTE
She has done very well since starting sertraline which was increased to 75 mg 12/2020.  She states medication has been very helpful and would like to continue.

## 2021-07-22 DIAGNOSIS — G47.00 INSOMNIA, UNSPECIFIED TYPE: ICD-10-CM

## 2021-07-22 RX ORDER — QUETIAPINE FUMARATE 25 MG/1
TABLET, FILM COATED ORAL
Qty: 90 TABLET | Refills: 1 | Status: SHIPPED | OUTPATIENT
Start: 2021-07-22 | End: 2022-01-17

## 2021-07-29 DIAGNOSIS — E03.9 HYPOTHYROIDISM, UNSPECIFIED TYPE: ICD-10-CM

## 2021-07-29 RX ORDER — LEVOTHYROXINE SODIUM 0.07 MG/1
75 TABLET ORAL DAILY
Qty: 90 TABLET | Refills: 1 | Status: SHIPPED | OUTPATIENT
Start: 2021-07-29 | End: 2022-01-24

## 2021-08-02 DIAGNOSIS — F32.9 REACTIVE DEPRESSION: ICD-10-CM

## 2021-08-14 DIAGNOSIS — I10 ESSENTIAL HYPERTENSION: ICD-10-CM

## 2021-08-16 RX ORDER — LISINOPRIL 5 MG/1
5 TABLET ORAL DAILY
Qty: 90 TABLET | Refills: 1 | Status: SHIPPED | OUTPATIENT
Start: 2021-08-16 | End: 2022-02-08

## 2021-09-27 DIAGNOSIS — F32.9 REACTIVE DEPRESSION: ICD-10-CM

## 2021-10-05 ENCOUNTER — APPOINTMENT (OUTPATIENT)
Dept: WOMENS IMAGING | Facility: HOSPITAL | Age: 71
End: 2021-10-05

## 2021-10-05 ENCOUNTER — OFFICE VISIT (OUTPATIENT)
Dept: INTERNAL MEDICINE | Facility: CLINIC | Age: 71
End: 2021-10-05

## 2021-10-05 DIAGNOSIS — Z12.31 ENCOUNTER FOR SCREENING MAMMOGRAM FOR BREAST CANCER: Primary | ICD-10-CM

## 2021-10-05 DIAGNOSIS — Z12.31 ENCOUNTER FOR SCREENING MAMMOGRAM FOR BREAST CANCER: ICD-10-CM

## 2021-10-05 LAB
ALBUMIN SERPL-MCNC: 4.6 G/DL (ref 3.5–5.2)
ALBUMIN/GLOB SERPL: 2.1 G/DL
ALP SERPL-CCNC: 90 U/L (ref 39–117)
ALT SERPL-CCNC: 13 U/L (ref 1–33)
AST SERPL-CCNC: 17 U/L (ref 1–32)
BASOPHILS # BLD AUTO: 0.08 10*3/MM3 (ref 0–0.2)
BASOPHILS NFR BLD AUTO: 1.8 % (ref 0–1.5)
BILIRUB SERPL-MCNC: 0.4 MG/DL (ref 0–1.2)
BUN SERPL-MCNC: 11 MG/DL (ref 8–23)
BUN/CREAT SERPL: 12.4 (ref 7–25)
CALCIUM SERPL-MCNC: 9.8 MG/DL (ref 8.6–10.5)
CHLORIDE SERPL-SCNC: 101 MMOL/L (ref 98–107)
CHOLEST SERPL-MCNC: 244 MG/DL (ref 0–200)
CO2 SERPL-SCNC: 26.6 MMOL/L (ref 22–29)
CREAT SERPL-MCNC: 0.89 MG/DL (ref 0.57–1)
EOSINOPHIL # BLD AUTO: 0.1 10*3/MM3 (ref 0–0.4)
EOSINOPHIL NFR BLD AUTO: 2.3 % (ref 0.3–6.2)
ERYTHROCYTE [DISTWIDTH] IN BLOOD BY AUTOMATED COUNT: 14.5 % (ref 12.3–15.4)
GLOBULIN SER CALC-MCNC: 2.2 GM/DL
GLUCOSE SERPL-MCNC: 97 MG/DL (ref 65–99)
HCT VFR BLD AUTO: 43.3 % (ref 34–46.6)
HDLC SERPL-MCNC: 56 MG/DL (ref 40–60)
HGB BLD-MCNC: 14.2 G/DL (ref 12–15.9)
IMM GRANULOCYTES # BLD AUTO: 0.01 10*3/MM3 (ref 0–0.05)
IMM GRANULOCYTES NFR BLD AUTO: 0.2 % (ref 0–0.5)
LDLC SERPL CALC-MCNC: 168 MG/DL (ref 0–100)
LYMPHOCYTES # BLD AUTO: 1.73 10*3/MM3 (ref 0.7–3.1)
LYMPHOCYTES NFR BLD AUTO: 39.1 % (ref 19.6–45.3)
MCH RBC QN AUTO: 29.2 PG (ref 26.6–33)
MCHC RBC AUTO-ENTMCNC: 32.8 G/DL (ref 31.5–35.7)
MCV RBC AUTO: 89.1 FL (ref 79–97)
MONOCYTES # BLD AUTO: 0.43 10*3/MM3 (ref 0.1–0.9)
MONOCYTES NFR BLD AUTO: 9.7 % (ref 5–12)
NEUTROPHILS # BLD AUTO: 2.08 10*3/MM3 (ref 1.7–7)
NEUTROPHILS NFR BLD AUTO: 46.9 % (ref 42.7–76)
NRBC BLD AUTO-RTO: 0 /100 WBC (ref 0–0.2)
PLATELET # BLD AUTO: 323 10*3/MM3 (ref 140–450)
POTASSIUM SERPL-SCNC: 4.4 MMOL/L (ref 3.5–5.2)
PROT SERPL-MCNC: 6.8 G/DL (ref 6–8.5)
RBC # BLD AUTO: 4.86 10*6/MM3 (ref 3.77–5.28)
SODIUM SERPL-SCNC: 139 MMOL/L (ref 136–145)
T4 FREE SERPL-MCNC: 1.46 NG/DL (ref 0.93–1.7)
TRIGL SERPL-MCNC: 111 MG/DL (ref 0–150)
TSH SERPL DL<=0.005 MIU/L-ACNC: 1.15 UIU/ML (ref 0.27–4.2)
VLDLC SERPL CALC-MCNC: 20 MG/DL (ref 5–40)
WBC # BLD AUTO: 4.43 10*3/MM3 (ref 3.4–10.8)

## 2021-10-05 PROCEDURE — 77067 SCR MAMMO BI INCL CAD: CPT | Performed by: RADIOLOGY

## 2021-10-05 PROCEDURE — 77067 SCR MAMMO BI INCL CAD: CPT | Performed by: NURSE PRACTITIONER

## 2021-11-23 DIAGNOSIS — F32.9 REACTIVE DEPRESSION: ICD-10-CM

## 2022-01-15 DIAGNOSIS — G47.00 INSOMNIA, UNSPECIFIED TYPE: ICD-10-CM

## 2022-01-17 RX ORDER — QUETIAPINE FUMARATE 25 MG/1
TABLET, FILM COATED ORAL
Qty: 90 TABLET | Refills: 1 | Status: SHIPPED | OUTPATIENT
Start: 2022-01-17 | End: 2022-07-18

## 2022-01-22 DIAGNOSIS — E03.9 HYPOTHYROIDISM, UNSPECIFIED TYPE: ICD-10-CM

## 2022-01-22 DIAGNOSIS — F32.9 REACTIVE DEPRESSION: ICD-10-CM

## 2022-01-24 RX ORDER — LEVOTHYROXINE SODIUM 0.07 MG/1
75 TABLET ORAL DAILY
Qty: 90 TABLET | Refills: 3 | Status: SHIPPED | OUTPATIENT
Start: 2022-01-24

## 2022-02-08 DIAGNOSIS — I10 ESSENTIAL HYPERTENSION: ICD-10-CM

## 2022-02-08 RX ORDER — LISINOPRIL 5 MG/1
5 TABLET ORAL DAILY
Qty: 90 TABLET | Refills: 1 | Status: SHIPPED | OUTPATIENT
Start: 2022-02-08 | End: 2023-02-16

## 2022-03-22 DIAGNOSIS — F32.9 REACTIVE DEPRESSION: ICD-10-CM

## 2022-04-08 ENCOUNTER — OFFICE VISIT (OUTPATIENT)
Dept: INTERNAL MEDICINE | Facility: CLINIC | Age: 72
End: 2022-04-08

## 2022-04-08 VITALS
SYSTOLIC BLOOD PRESSURE: 132 MMHG | DIASTOLIC BLOOD PRESSURE: 72 MMHG | TEMPERATURE: 97.7 F | OXYGEN SATURATION: 97 % | HEART RATE: 68 BPM | HEIGHT: 66 IN | WEIGHT: 155 LBS | BODY MASS INDEX: 24.91 KG/M2

## 2022-04-08 DIAGNOSIS — F51.01 PRIMARY INSOMNIA: Chronic | ICD-10-CM

## 2022-04-08 DIAGNOSIS — I10 ESSENTIAL HYPERTENSION: Primary | Chronic | ICD-10-CM

## 2022-04-08 DIAGNOSIS — Z12.11 SCREENING FOR COLON CANCER: ICD-10-CM

## 2022-04-08 DIAGNOSIS — F32.9 REACTIVE DEPRESSION: Chronic | ICD-10-CM

## 2022-04-08 DIAGNOSIS — E03.9 ACQUIRED HYPOTHYROIDISM: Chronic | ICD-10-CM

## 2022-04-08 PROCEDURE — 99214 OFFICE O/P EST MOD 30 MIN: CPT | Performed by: NURSE PRACTITIONER

## 2022-04-08 NOTE — PROGRESS NOTES
"Chief Complaint  Hypertension (6 month follow up ), Hypothyroidism, and Depression    Subjective          Tammy Torres presents to [facility] for f/u regarding HTN, hypothyroidism and depression.    She reports feeling well, notes improved sleep with Seroquel which she is tolerating well (denies lethargy in the morning).  Constipation has completed resolved with dietary changes, no longer requiring medication.    Objective   Vital Signs:   /72 (BP Location: Left arm, Patient Position: Sitting, Cuff Size: Adult)   Pulse 68   Temp 97.7 °F (36.5 °C) (Temporal)   Ht 167.6 cm (66\")   Wt 70.3 kg (155 lb)   SpO2 97%   BMI 25.02 kg/m²            Physical Exam  Constitutional:       Appearance: She is well-developed. She is not ill-appearing.   HENT:      Head: Normocephalic.      Right Ear: Hearing, tympanic membrane and external ear normal.      Left Ear: Hearing, tympanic membrane and external ear normal.      Nose: Nose normal. No nasal deformity, mucosal edema or rhinorrhea.      Right Sinus: No maxillary sinus tenderness or frontal sinus tenderness.      Left Sinus: No maxillary sinus tenderness or frontal sinus tenderness.      Mouth/Throat:      Dentition: Normal dentition.   Eyes:      General: Lids are normal.         Right eye: No discharge.         Left eye: No discharge.      Conjunctiva/sclera: Conjunctivae normal.      Right eye: No exudate.     Left eye: No exudate.  Neck:      Thyroid: No thyroid mass or thyromegaly.      Vascular: No carotid bruit.      Trachea: Trachea normal.   Cardiovascular:      Rate and Rhythm: Regular rhythm.      Pulses: Normal pulses.      Heart sounds: Normal heart sounds. No murmur heard.  Pulmonary:      Effort: No respiratory distress.      Breath sounds: Normal breath sounds. No decreased breath sounds, wheezing, rhonchi or rales.   Abdominal:      General: Bowel sounds are normal.      Palpations: Abdomen is soft.      Tenderness: " There is no abdominal tenderness.   Musculoskeletal:      Cervical back: Normal range of motion. No edema.   Lymphadenopathy:      Head:      Right side of head: No submental, submandibular, tonsillar, preauricular, posterior auricular or occipital adenopathy.      Left side of head: No submental, submandibular, tonsillar, preauricular, posterior auricular or occipital adenopathy.   Skin:     General: Skin is warm and dry.      Nails: There is no clubbing.   Neurological:      Mental Status: She is alert.   Psychiatric:         Behavior: Behavior is cooperative.        Result Review :   The following data was reviewed by: MARILOU Alston on 04/08/2022:  Common labs    Common Labsle 10/5/21 10/5/21 10/5/21 4/8/22    0928 0928 0928    Glucose  97  129 (A)   BUN  11  10   Creatinine  0.89  0.90   eGFR Non  Am  63     eGFR African Am  76     Sodium  139  136   Potassium  4.4  4.1   Chloride  101  95 (A)   Calcium  9.8  9.3   Total Protein  6.8  6.7   Albumin  4.60  4.5   Total Bilirubin  0.4  0.2   Alkaline Phosphatase  90  83   AST (SGOT)  17  29   ALT (SGPT)  13  29   WBC 4.43      Hemoglobin 14.2      Hematocrit 43.3      Platelets 323      Total Cholesterol   244 (A)    Triglycerides   111    HDL Cholesterol   56    LDL Cholesterol    168 (A)    (A) Abnormal value       Comments are available for some flowsheets but are not being displayed.                     Assessment and Plan    Diagnoses and all orders for this visit:    1. Essential hypertension (Primary)  Assessment & Plan:  BP is well-controlled on Lisinopril which she will continue along with a low sodium diet.    Orders:  -     Comprehensive Metabolic Panel    2. Acquired hypothyroidism  Assessment & Plan:  She is currently managed on Synthroid 75 mcg daily, recheck TSH.    Orders:  -     T4, Free  -     TSH    3. Reactive depression  Assessment & Plan:  Sx managed on Sertraline daily, tolerating well.      4. Primary insomnia  Assessment &  Plan:  She has been managed on Seroquel for many years which has been helpful, tolerating well.        Follow Up   Return in about 6 months (around 10/8/2022).  Patient was given instructions and counseling regarding her condition or for health maintenance advice. Please see specific information pulled into the AVS if appropriate.

## 2022-04-09 LAB
ALBUMIN SERPL-MCNC: 4.5 G/DL (ref 3.7–4.7)
ALBUMIN/GLOB SERPL: 2 {RATIO} (ref 1.2–2.2)
ALP SERPL-CCNC: 83 IU/L (ref 44–121)
ALT SERPL-CCNC: 29 IU/L (ref 0–32)
AST SERPL-CCNC: 29 IU/L (ref 0–40)
BILIRUB SERPL-MCNC: 0.2 MG/DL (ref 0–1.2)
BUN SERPL-MCNC: 10 MG/DL (ref 8–27)
BUN/CREAT SERPL: 11 (ref 12–28)
CALCIUM SERPL-MCNC: 9.3 MG/DL (ref 8.7–10.3)
CHLORIDE SERPL-SCNC: 95 MMOL/L (ref 96–106)
CO2 SERPL-SCNC: 21 MMOL/L (ref 20–29)
CREAT SERPL-MCNC: 0.9 MG/DL (ref 0.57–1)
EGFRCR SERPLBLD CKD-EPI 2021: 68 ML/MIN/1.73
GLOBULIN SER CALC-MCNC: 2.2 G/DL (ref 1.5–4.5)
GLUCOSE SERPL-MCNC: 129 MG/DL (ref 65–99)
POTASSIUM SERPL-SCNC: 4.1 MMOL/L (ref 3.5–5.2)
PROT SERPL-MCNC: 6.7 G/DL (ref 6–8.5)
SODIUM SERPL-SCNC: 136 MMOL/L (ref 134–144)
T4 FREE SERPL-MCNC: 1.05 NG/DL (ref 0.82–1.77)
TSH SERPL DL<=0.005 MIU/L-ACNC: 3.23 UIU/ML (ref 0.45–4.5)

## 2022-04-11 NOTE — PROGRESS NOTES
Tammy, your TSH was within therapeutic range-continue current Synthroid dose.  Kidney and liver function as well as electrolytes are normal.  Glucose is mildly elevated but okay since labs were done nonfasting.

## 2022-06-07 DIAGNOSIS — F32.9 REACTIVE DEPRESSION: ICD-10-CM

## 2022-06-21 NOTE — TELEPHONE ENCOUNTER
----- Message from Hina Mir sent at 5/2/2018  2:48 PM EDT -----  Pt calling to know what you would like to do.  She is on her last month of an anti-inflamatory   She would like to know does she need to stay on it? does she need an appt?  Please advise  Pt says she does well while taking it but has terrible pain in her Left Hip and Left Knee when not.    Pt#[phone]     Doxycycline Pregnancy And Lactation Text: This medication is Pregnancy Category D and not consider safe during pregnancy. It is also excreted in breast milk but is considered safe for shorter treatment courses.

## 2022-07-17 DIAGNOSIS — G47.00 INSOMNIA, UNSPECIFIED TYPE: ICD-10-CM

## 2022-07-18 RX ORDER — QUETIAPINE FUMARATE 25 MG/1
TABLET, FILM COATED ORAL
Qty: 90 TABLET | Refills: 1 | Status: SHIPPED | OUTPATIENT
Start: 2022-07-18 | End: 2023-01-09

## 2022-07-31 DIAGNOSIS — F32.9 REACTIVE DEPRESSION: ICD-10-CM

## 2022-10-11 ENCOUNTER — OFFICE VISIT (OUTPATIENT)
Dept: INTERNAL MEDICINE | Facility: CLINIC | Age: 72
End: 2022-10-11

## 2022-10-11 VITALS
TEMPERATURE: 98 F | HEIGHT: 66 IN | SYSTOLIC BLOOD PRESSURE: 118 MMHG | HEART RATE: 60 BPM | DIASTOLIC BLOOD PRESSURE: 74 MMHG | OXYGEN SATURATION: 97 % | BODY MASS INDEX: 24.43 KG/M2 | WEIGHT: 152 LBS

## 2022-10-11 DIAGNOSIS — Z23 NEED FOR INFLUENZA VACCINATION: ICD-10-CM

## 2022-10-11 DIAGNOSIS — I10 ESSENTIAL HYPERTENSION: Chronic | ICD-10-CM

## 2022-10-11 DIAGNOSIS — F51.01 PRIMARY INSOMNIA: Chronic | ICD-10-CM

## 2022-10-11 DIAGNOSIS — E03.9 ACQUIRED HYPOTHYROIDISM: Primary | Chronic | ICD-10-CM

## 2022-10-11 DIAGNOSIS — F32.9 REACTIVE DEPRESSION: Chronic | ICD-10-CM

## 2022-10-11 PROCEDURE — G0439 PPPS, SUBSEQ VISIT: HCPCS | Performed by: NURSE PRACTITIONER

## 2022-10-11 PROCEDURE — 1159F MED LIST DOCD IN RCRD: CPT | Performed by: NURSE PRACTITIONER

## 2022-10-11 PROCEDURE — 90662 IIV NO PRSV INCREASED AG IM: CPT | Performed by: NURSE PRACTITIONER

## 2022-10-11 PROCEDURE — 1170F FXNL STATUS ASSESSED: CPT | Performed by: NURSE PRACTITIONER

## 2022-10-11 PROCEDURE — G0008 ADMIN INFLUENZA VIRUS VAC: HCPCS | Performed by: NURSE PRACTITIONER

## 2022-10-24 NOTE — PROGRESS NOTES
The ABCs of the Annual Wellness Visit  Subsequent Medicare Wellness Visit    Chief Complaint   Patient presents with   • Medicare Wellness-subsequent      Subjective    History of Present Illness:  Tammy Torres is a 72 y.o. female who presents for a Subsequent Medicare Wellness Visit.    She is doing well, does c/o right shoulder pain and stiffness wihtout parestehsias. Denies acute injury or trauma.     The following portions of the patient's history were reviewed and   updated as appropriate: allergies, current medications, past family history, past medical history, past social history, past surgical history and problem list.    Compared to one year ago, the patient feels her physical   health is the same.    Compared to one year ago, the patient feels her mental   health is the same.    Recent Hospitalizations:  She was not admitted to the hospital during the last year.       Current Medical Providers:  Patient Care Team:  Hina Rosen APRN as PCP - General (Internal Medicine)    Outpatient Medications Prior to Visit   Medication Sig Dispense Refill   • BIOTIN PO Take  by mouth.     • COLLAGEN PO Take  by mouth.     • levothyroxine (SYNTHROID, LEVOTHROID) 75 MCG tablet TAKE 1 TABLET BY MOUTH DAILY 90 tablet 3   • lisinopril (PRINIVIL,ZESTRIL) 5 MG tablet TAKE 1 TABLET BY MOUTH DAILY 90 tablet 1   • QUEtiapine (SEROquel) 25 MG tablet TAKE 1 TABLET BY MOUTH AT NIGHT AS NEEDED FOR INSOMNIA 90 tablet 1   • sertraline (ZOLOFT) 50 MG tablet TAKE 1 AND 1/2 TABLETS BY MOUTH DAILY 45 tablet 1   • TURMERIC PO Take  by mouth.     • cetirizine (zyrTEC) 10 MG tablet Take 10 mg by mouth Daily.     • fluticasone (FLONASE SENSIMIST) 27.5 MCG/SPRAY nasal spray 2 sprays into the nostril(s) as directed by provider Daily. 5.9 mL 0   • meclizine (ANTIVERT) 25 MG tablet Take 1 tablet by mouth 3 (Three) Times a Day As Needed for dizziness or Nausea. 30 tablet 0   • Multiple Vitamin (MULTI VITAMIN DAILY PO) Take  by mouth.    "    No facility-administered medications prior to visit.       No opioid medication identified on active medication list. I have reviewed chart for other potential  high risk medication/s and harmful drug interactions in the elderly.          Aspirin is not on active medication list.  Aspirin use is not indicated based on review of current medical condition/s. Risk of harm outweighs potential benefits.  .    Patient Active Problem List   Diagnosis   • Depression   • Essential hypertension   • Acquired hypothyroidism   • Insomnia   • Rosacea     Advance Care Planning  Advance Directive is not on file.  ACP discussion was held with the patient during this visit. Patient has an advance directive (not in EMR), copy requested.    Review of Systems   Constitutional: Negative for fever.   HENT: Positive for congestion and postnasal drip.    Respiratory: Negative for cough, chest tightness and shortness of breath.    Cardiovascular: Negative for chest pain, palpitations and leg swelling.   Gastrointestinal: Negative for abdominal distention.   Musculoskeletal: Positive for arthralgias.   Psychiatric/Behavioral: Positive for sleep disturbance (improved with Seroquel).        Objective    Vitals:    10/11/22 1022   BP: 118/74   BP Location: Left arm   Patient Position: Sitting   Cuff Size: Adult   Pulse: 60   Temp: 98 °F (36.7 °C)   TempSrc: Infrared   SpO2: 97%   Weight: 68.9 kg (152 lb)   Height: 167.6 cm (66\")     Estimated body mass index is 24.53 kg/m² as calculated from the following:    Height as of this encounter: 167.6 cm (66\").    Weight as of this encounter: 68.9 kg (152 lb).    BMI is within normal parameters. No other follow-up for BMI required.      Does the patient have evidence of cognitive impairment? No    Physical Exam  Constitutional:       Appearance: She is well-developed. She is not ill-appearing.   HENT:      Head: Normocephalic.      Right Ear: Hearing, tympanic membrane and external ear normal.      " Left Ear: Hearing, tympanic membrane and external ear normal.      Nose: Nose normal. No nasal deformity, mucosal edema or rhinorrhea.      Right Sinus: No maxillary sinus tenderness or frontal sinus tenderness.      Left Sinus: No maxillary sinus tenderness or frontal sinus tenderness.      Mouth/Throat:      Dentition: Normal dentition.   Eyes:      General: Lids are normal.         Right eye: No discharge.         Left eye: No discharge.      Conjunctiva/sclera: Conjunctivae normal.      Right eye: No exudate.     Left eye: No exudate.  Neck:      Thyroid: No thyroid mass or thyromegaly.      Vascular: No carotid bruit.      Trachea: Trachea normal.   Cardiovascular:      Rate and Rhythm: Regular rhythm.      Pulses: Normal pulses.      Heart sounds: Normal heart sounds. No murmur heard.  Pulmonary:      Effort: No respiratory distress.      Breath sounds: Normal breath sounds. No decreased breath sounds, wheezing, rhonchi or rales.   Abdominal:      General: Bowel sounds are normal.      Palpations: Abdomen is soft.      Tenderness: There is no abdominal tenderness.   Musculoskeletal:      Cervical back: Normal range of motion. No edema.   Lymphadenopathy:      Head:      Right side of head: No submental, submandibular, tonsillar, preauricular, posterior auricular or occipital adenopathy.      Left side of head: No submental, submandibular, tonsillar, preauricular, posterior auricular or occipital adenopathy.   Skin:     General: Skin is warm and dry.      Nails: There is no clubbing.   Neurological:      Mental Status: She is alert.   Psychiatric:         Behavior: Behavior is cooperative.                 HEALTH RISK ASSESSMENT    Smoking Status:  Social History     Tobacco Use   Smoking Status Never   Smokeless Tobacco Never     Alcohol Consumption:  Social History     Substance and Sexual Activity   Alcohol Use Yes    Comment: Occcasional     Fall Risk Screen:    [facility], and  patient is at LOW risk for falls.Assessment completed on:10/11/2022    Depression Screening:  PHQ-2/PHQ-9 Depression Screening 10/11/2022   Retired PHQ-9 Total Score -   Retired Total Score -   Little Interest or Pleasure in Doing Things 0-->not at all   Feeling Down, Depressed or Hopeless 0-->not at all   PHQ-9: Brief Depression Severity Measure Score 0   Little interest or pleasure in doing things -   Feeling down, depressed, or hopeless -   Trouble falling or staying asleep, or sleeping too much -   Feeling tired or having little energy -   Poor appetite or overeating -   Feeling bad about yourself - or that you are a failure or have let yourself or your family down -   Trouble concentrating on things, such as reading the newspaper or watching television -   Moving or speaking so slowly that other people could have noticed. Or the opposite - being so fidgety or restless that you have been moving around a lot more than usual -   Thoughts that you would be better off dead, or of hurting yourself in some way -   How difficult have these problems made it for you to do your work, take care of things at home, or get along with other people? -       Health Habits and Functional and Cognitive Screening:  Functional & Cognitive Status 10/11/2022   Do you have difficulty preparing food and eating? No   Do you have difficulty bathing yourself, getting dressed or grooming yourself? No   Do you have difficulty using the toilet? No   Do you have difficulty moving around from place to place? No   Do you have trouble with steps or getting out of a bed or a chair? No   Current Diet Well Balanced Diet   Dental Exam Up to date   Eye Exam Up to date   Exercise (times per week) 3 times per week   Current Exercises Include Walking   Current Exercise Activities Include -   Do you need help using the phone?  No   Are you deaf or do you have serious difficulty hearing?  No   Do you need help with transportation? No   Do you need help  shopping? No   Do you need help preparing meals?  No   Do you need help with housework?  No   Do you need help with laundry? No   Do you need help taking your medications? No   Do you need help managing money? No   Do you ever drive or ride in a car without wearing a seat belt? No   Have you felt unusual stress, anger or loneliness in the last month? No   Who do you live with? Spouse   If you need help, do you have trouble finding someone available to you? No   Have you been bothered in the last four weeks by sexual problems? No   Do you have difficulty concentrating, remembering or making decisions? No       Age-appropriate Screening Schedule:  Refer to the list below for future screening recommendations based on patient's age, sex and/or medical conditions. Orders for these recommended tests are listed in the plan section. The patient has been provided with a written plan.    Health Maintenance   Topic Date Due   • TDAP/TD VACCINES (1 - Tdap) Never done   • LIPID PANEL  10/05/2022   • ZOSTER VACCINE (1 of 2) 11/22/2025 (Originally 7/5/2000)   • MAMMOGRAM  10/05/2023   • DXA SCAN  07/20/2026   • INFLUENZA VACCINE  Completed   • PAP SMEAR  Discontinued              Assessment & Plan   CMS Preventative Services Quick Reference  Risk Factors Identified During Encounter  Immunizations Discussed/Encouraged (specific Immunizations; Tdap and COVID19  The above risks/problems have been discussed with the patient.  Follow up actions/plans if indicated are seen below in the Assessment/Plan Section.  Pertinent information has been shared with the patient in the After Visit Summary.    Diagnoses and all orders for this visit:    1. Acquired hypothyroidism (Primary)  Assessment & Plan:  She is currently managed on Synthroid 75 mcg daily for replacement, recheck TSH.    Orders:  -     TSH    2. Essential hypertension  Assessment & Plan:  Hypertension is unchanged.  Continue current treatment regimen.  Dietary sodium  restriction.  Blood pressure will be reassessed at the next regular appointment.  She is currently managed on lisinopril daily, continue to monitor blood pressure.    Orders:  -     CBC & Differential  -     Comprehensive Metabolic Panel  -     Lipid Panel    3. Reactive depression  Assessment & Plan:  She is doing well with Zoloft which she will continue daily.      4. Primary insomnia  Assessment & Plan:  Symptoms managed with nightly Seroquel      5. Need for influenza vaccination  -     Fluzone High-Dose 65+yrs      Follow Up:   Return in about 6 months (around 4/11/2023).     An After Visit Summary and PPPS were made available to the patient.

## 2022-10-24 NOTE — PATIENT INSTRUCTIONS
Medicare Wellness  Personal Prevention Plan of Service     Date of Office Visit:    Encounter Provider:  MARILOU Alston  Place of Service:  [facility]  Patient Name: Tammy Torres  :  1950    As part of the Medicare Wellness portion of your visit today, we are providing you with this personalized preventive plan of services (PPPS). This plan is based upon recommendations of the [place] Preventive Services Task Force (USPSTF) and the Advisory Committee on Immunization Practices (ACIP).    This lists the preventive care services that should be considered, and provides dates of when you are due. Items listed as completed are up-to-date and do not require any further intervention.    Health Maintenance   Topic Date Due    TDAP/TD VACCINES (1 - Tdap) Never done    COVID-19 Vaccine (4 - Booster for Pfizer series) 2021    ANNUAL WELLNESS VISIT  2022    LIPID PANEL  10/05/2022    ZOSTER VACCINE (1 of 2) 2025 (Originally 2000)    Pneumococcal Vaccine 65+ (1 - PCV) 2026 (Originally 2015)    MAMMOGRAM  10/05/2023    COLORECTAL CANCER SCREENING  2025    DXA SCAN  2026    HEPATITIS C SCREENING  Completed    INFLUENZA VACCINE  Completed    PAP SMEAR  Discontinued       Orders Placed This Encounter   Procedures    Fluzone High-Dose 65+yrs    Comprehensive Metabolic Panel     Order Specific Question:   Release to patient     Answer:   Routine Release    Lipid Panel    TSH     Order Specific Question:   Release to patient     Answer:   Routine Release    CBC & Differential     Order Specific Question:   Manual Differential     Answer:   No       Return in about 6 months (around 2023).

## 2022-10-24 NOTE — ASSESSMENT & PLAN NOTE
Hypertension is unchanged.  Continue current treatment regimen.  Dietary sodium restriction.  Blood pressure will be reassessed at the next regular appointment.  She is currently managed on lisinopril daily, continue to monitor blood pressure.

## 2022-11-01 LAB
ALBUMIN SERPL-MCNC: 4.5 G/DL (ref 3.5–5.2)
ALBUMIN/GLOB SERPL: 2.1 G/DL
ALP SERPL-CCNC: 90 U/L (ref 39–117)
ALT SERPL-CCNC: 14 U/L (ref 1–33)
AST SERPL-CCNC: 14 U/L (ref 1–32)
BASOPHILS # BLD AUTO: 0.07 10*3/MM3 (ref 0–0.2)
BASOPHILS NFR BLD AUTO: 1.3 % (ref 0–1.5)
BILIRUB SERPL-MCNC: 0.4 MG/DL (ref 0–1.2)
BUN SERPL-MCNC: 11 MG/DL (ref 8–23)
BUN/CREAT SERPL: 11.7 (ref 7–25)
CALCIUM SERPL-MCNC: 10 MG/DL (ref 8.6–10.5)
CHLORIDE SERPL-SCNC: 103 MMOL/L (ref 98–107)
CHOLEST SERPL-MCNC: 241 MG/DL (ref 0–200)
CO2 SERPL-SCNC: 25.4 MMOL/L (ref 22–29)
CREAT SERPL-MCNC: 0.94 MG/DL (ref 0.57–1)
EGFRCR SERPLBLD CKD-EPI 2021: 64.6 ML/MIN/1.73
EOSINOPHIL # BLD AUTO: 0.13 10*3/MM3 (ref 0–0.4)
EOSINOPHIL NFR BLD AUTO: 2.5 % (ref 0.3–6.2)
ERYTHROCYTE [DISTWIDTH] IN BLOOD BY AUTOMATED COUNT: 13.9 % (ref 12.3–15.4)
GLOBULIN SER CALC-MCNC: 2.1 GM/DL
GLUCOSE SERPL-MCNC: 92 MG/DL (ref 65–99)
HCT VFR BLD AUTO: 43.2 % (ref 34–46.6)
HDLC SERPL-MCNC: 59 MG/DL (ref 40–60)
HGB BLD-MCNC: 14.6 G/DL (ref 12–15.9)
IMM GRANULOCYTES # BLD AUTO: 0.01 10*3/MM3 (ref 0–0.05)
IMM GRANULOCYTES NFR BLD AUTO: 0.2 % (ref 0–0.5)
LDLC SERPL CALC-MCNC: 167 MG/DL (ref 0–100)
LYMPHOCYTES # BLD AUTO: 1.94 10*3/MM3 (ref 0.7–3.1)
LYMPHOCYTES NFR BLD AUTO: 37.1 % (ref 19.6–45.3)
MCH RBC QN AUTO: 29.9 PG (ref 26.6–33)
MCHC RBC AUTO-ENTMCNC: 33.8 G/DL (ref 31.5–35.7)
MCV RBC AUTO: 88.5 FL (ref 79–97)
MONOCYTES # BLD AUTO: 0.48 10*3/MM3 (ref 0.1–0.9)
MONOCYTES NFR BLD AUTO: 9.2 % (ref 5–12)
NEUTROPHILS # BLD AUTO: 2.6 10*3/MM3 (ref 1.7–7)
NEUTROPHILS NFR BLD AUTO: 49.7 % (ref 42.7–76)
NRBC BLD AUTO-RTO: 0 /100 WBC (ref 0–0.2)
PLATELET # BLD AUTO: 339 10*3/MM3 (ref 140–450)
POTASSIUM SERPL-SCNC: 4.7 MMOL/L (ref 3.5–5.2)
PROT SERPL-MCNC: 6.6 G/DL (ref 6–8.5)
RBC # BLD AUTO: 4.88 10*6/MM3 (ref 3.77–5.28)
SODIUM SERPL-SCNC: 138 MMOL/L (ref 136–145)
TRIGL SERPL-MCNC: 88 MG/DL (ref 0–150)
TSH SERPL DL<=0.005 MIU/L-ACNC: 1.19 UIU/ML (ref 0.27–4.2)
VLDLC SERPL CALC-MCNC: 15 MG/DL (ref 5–40)
WBC # BLD AUTO: 5.23 10*3/MM3 (ref 3.4–10.8)

## 2022-11-02 DIAGNOSIS — F32.9 REACTIVE DEPRESSION: ICD-10-CM

## 2023-01-09 DIAGNOSIS — G47.00 INSOMNIA, UNSPECIFIED TYPE: ICD-10-CM

## 2023-01-09 RX ORDER — QUETIAPINE FUMARATE 25 MG/1
TABLET, FILM COATED ORAL
Qty: 90 TABLET | Refills: 1 | Status: SHIPPED | OUTPATIENT
Start: 2023-01-09

## 2023-01-30 DIAGNOSIS — H81.13 BENIGN PAROXYSMAL POSITIONAL VERTIGO DUE TO BILATERAL VESTIBULAR DISORDER: ICD-10-CM

## 2023-01-30 RX ORDER — ONDANSETRON 4 MG/1
4 TABLET, FILM COATED ORAL EVERY 8 HOURS PRN
Qty: 30 TABLET | Refills: 0 | Status: SHIPPED | OUTPATIENT
Start: 2023-01-30 | End: 2023-02-02

## 2023-02-01 NOTE — PROGRESS NOTES
Pt c/o intermittent nausea in the morning, improved with Zofran which she uses rarely (last filled several years ago). Rx filled but advised to RTC if sx persist and/or worsen.

## 2023-02-04 DIAGNOSIS — F32.9 REACTIVE DEPRESSION: ICD-10-CM

## 2023-02-06 ENCOUNTER — TELEMEDICINE (OUTPATIENT)
Dept: INTERNAL MEDICINE | Facility: CLINIC | Age: 73
End: 2023-02-06
Payer: MEDICARE

## 2023-02-06 DIAGNOSIS — U07.1 COVID-19: Primary | ICD-10-CM

## 2023-02-06 DIAGNOSIS — N30.00 ACUTE CYSTITIS WITHOUT HEMATURIA: ICD-10-CM

## 2023-02-06 DIAGNOSIS — R11.0 NAUSEA: ICD-10-CM

## 2023-02-06 PROCEDURE — 99214 OFFICE O/P EST MOD 30 MIN: CPT | Performed by: NURSE PRACTITIONER

## 2023-02-06 RX ORDER — PROMETHAZINE HYDROCHLORIDE 25 MG/1
25 TABLET ORAL EVERY 6 HOURS PRN
Qty: 20 TABLET | Refills: 0 | Status: SHIPPED | OUTPATIENT
Start: 2023-02-06 | End: 2023-02-11

## 2023-02-13 NOTE — PROGRESS NOTES
"Chief Complaint  Covid-19 Home Monitoring Video Visit and Urinary Tract Infection    Subjective        Tammy Torres presents to [facility] due to COVID-19. She also c/o nausea with recent dx of UTI.    You have chosen to receive care through a telehealth visit.  Do you consent to use a video/audio connection for your medical care today? Yes    Location of patient[place] home  [place] [facility] [place]  The visit included audio and video interaction between patient and provider due to the COVID-19 pandemic.    History of Present Illness  She presented to [place] [facility] 2/4/23 duet o increased nausea with body aches and generalized malaise. She tested positive for COVID-19, also treated for an acute UTI with Bactrim.  She presents today due to increased nausea with difficult eating due to symptoms, denies abdominal pain. No fever and/or chills. Denies dyspnea.      Objective   Vital Signs:  There were no vitals taken for this visit.  Estimated body mass index is 24.53 kg/m² as calculated from the following:    Height as of 10/11/22: 167.6 cm (66\").    Weight as of 10/11/22: 68.9 kg (152 lb).       BMI is within normal parameters. No other follow-up for BMI required.      Physical Exam  Constitutional:       Appearance: She is well-developed.      Comments: Pt is lying in bed   HENT:      Head: Normocephalic and atraumatic.   Eyes:      General:         Right eye: No discharge.         Left eye: No discharge.      Conjunctiva/sclera: Conjunctivae normal.   Pulmonary:      Effort: Pulmonary effort is normal.   Neurological:      Mental Status: She is alert and oriented to person, place, and time.   Psychiatric:         Behavior: Behavior normal.         Thought Content: Thought content normal.         Judgment: Judgment normal.        Result Review :                   Assessment and Plan   Diagnoses and all orders for this visit:    1. COVID-19 (Primary)  -     promethazine (PHENERGAN) 25 MG " tablet; Take 1 tablet by mouth Every 6 (Six) Hours As Needed for Nausea or Vomiting for up to 5 days.  Dispense: 20 tablet; Refill: 0    2. Acute cystitis without hematuria    3. Nausea  -     promethazine (PHENERGAN) 25 MG tablet; Take 1 tablet by mouth Every 6 (Six) Hours As Needed for Nausea or Vomiting for up to 5 days.  Dispense: 20 tablet; Refill: 0    1. COVID-19: she notes minimal congestion, denies dyspnea.  2. UTI-reviewed results from [facility], urine culture did not show any growth. She is advised to d/c Bactrim due to worsening nausea.  3. Nausea-due to Bactrim (increased sx since starting medication), will begin Phenergan as needed but continue to monitor and f/u if sx do not improve.    History and medical judgement obtained from video conversation with patient. No hands-on physical examination was performed. Approximately 18 minutes spent in video conversation with patient and in chart review.         Follow Up   Return if symptoms worsen or fail to improve, for Next scheduled follow up.  Patient was given instructions and counseling regarding her condition or for health maintenance advice. Please see specific information pulled into the AVS if appropriate.

## 2023-02-16 DIAGNOSIS — I10 ESSENTIAL HYPERTENSION: ICD-10-CM

## 2023-02-16 RX ORDER — LISINOPRIL 5 MG/1
5 TABLET ORAL DAILY
Qty: 90 TABLET | Refills: 1 | Status: SHIPPED | OUTPATIENT
Start: 2023-02-16

## 2023-04-27 DIAGNOSIS — E03.9 HYPOTHYROIDISM, UNSPECIFIED TYPE: ICD-10-CM

## 2023-04-27 RX ORDER — LEVOTHYROXINE SODIUM 0.07 MG/1
75 TABLET ORAL DAILY
Qty: 30 TABLET | Refills: 0 | Status: SHIPPED | OUTPATIENT
Start: 2023-04-27

## 2023-05-22 ENCOUNTER — OFFICE VISIT (OUTPATIENT)
Dept: INTERNAL MEDICINE | Facility: CLINIC | Age: 73
End: 2023-05-22
Payer: MEDICARE

## 2023-05-22 VITALS
WEIGHT: 147.4 LBS | OXYGEN SATURATION: 98 % | BODY MASS INDEX: 23.69 KG/M2 | HEART RATE: 67 BPM | SYSTOLIC BLOOD PRESSURE: 122 MMHG | DIASTOLIC BLOOD PRESSURE: 68 MMHG | TEMPERATURE: 98 F | HEIGHT: 66 IN

## 2023-05-22 DIAGNOSIS — E03.9 ACQUIRED HYPOTHYROIDISM: Primary | Chronic | ICD-10-CM

## 2023-05-22 DIAGNOSIS — F32.9 REACTIVE DEPRESSION: Chronic | ICD-10-CM

## 2023-05-22 DIAGNOSIS — I10 ESSENTIAL HYPERTENSION: Chronic | ICD-10-CM

## 2023-05-22 PROCEDURE — 3074F SYST BP LT 130 MM HG: CPT | Performed by: NURSE PRACTITIONER

## 2023-05-22 PROCEDURE — 3078F DIAST BP <80 MM HG: CPT | Performed by: NURSE PRACTITIONER

## 2023-05-22 PROCEDURE — 1160F RVW MEDS BY RX/DR IN RCRD: CPT | Performed by: NURSE PRACTITIONER

## 2023-05-22 PROCEDURE — 1159F MED LIST DOCD IN RCRD: CPT | Performed by: NURSE PRACTITIONER

## 2023-05-22 PROCEDURE — 99214 OFFICE O/P EST MOD 30 MIN: CPT | Performed by: NURSE PRACTITIONER

## 2023-05-23 LAB
ALBUMIN SERPL-MCNC: 4.4 G/DL (ref 3.5–5.2)
ALBUMIN/GLOB SERPL: 1.8 G/DL
ALP SERPL-CCNC: 84 U/L (ref 39–117)
ALT SERPL-CCNC: 14 U/L (ref 1–33)
AST SERPL-CCNC: 17 U/L (ref 1–32)
BILIRUB SERPL-MCNC: 0.3 MG/DL (ref 0–1.2)
BUN SERPL-MCNC: 15 MG/DL (ref 8–23)
BUN/CREAT SERPL: 14.9 (ref 7–25)
CALCIUM SERPL-MCNC: 9.9 MG/DL (ref 8.6–10.5)
CHLORIDE SERPL-SCNC: 103 MMOL/L (ref 98–107)
CO2 SERPL-SCNC: 25.6 MMOL/L (ref 22–29)
CREAT SERPL-MCNC: 1.01 MG/DL (ref 0.57–1)
EGFRCR SERPLBLD CKD-EPI 2021: 59.3 ML/MIN/1.73
ERYTHROCYTE [DISTWIDTH] IN BLOOD BY AUTOMATED COUNT: 13.7 % (ref 12.3–15.4)
GLOBULIN SER CALC-MCNC: 2.5 GM/DL
GLUCOSE SERPL-MCNC: 106 MG/DL (ref 65–99)
HCT VFR BLD AUTO: 43.2 % (ref 34–46.6)
HGB BLD-MCNC: 14.3 G/DL (ref 12–15.9)
MCH RBC QN AUTO: 29.1 PG (ref 26.6–33)
MCHC RBC AUTO-ENTMCNC: 33.1 G/DL (ref 31.5–35.7)
MCV RBC AUTO: 88 FL (ref 79–97)
PLATELET # BLD AUTO: 333 10*3/MM3 (ref 140–450)
POTASSIUM SERPL-SCNC: 5 MMOL/L (ref 3.5–5.2)
PROT SERPL-MCNC: 6.9 G/DL (ref 6–8.5)
RBC # BLD AUTO: 4.91 10*6/MM3 (ref 3.77–5.28)
SODIUM SERPL-SCNC: 139 MMOL/L (ref 136–145)
TSH SERPL DL<=0.005 MIU/L-ACNC: 1.13 UIU/ML (ref 0.27–4.2)
WBC # BLD AUTO: 5.85 10*3/MM3 (ref 3.4–10.8)

## 2023-05-23 NOTE — ASSESSMENT & PLAN NOTE
She is on Synthroid 75 mcg daily for replacement.   Lab Results   Component Value Date    TSH 1.190 10/31/2022

## 2023-05-23 NOTE — PROGRESS NOTES
"Chief Complaint  Hypertension (6 month follow up), Hypothyroidism, and Depression    Subjective        Tammy Torres presents to [facility] for f/u regarding HTN, hypothyroidism, and depression.    History of Present Illness  She has remained busy in helping care for her 6 weeks old twins, has not slept well (not taking Seroquel) but is overall doing well. Denies chest pain and/or shortness of breath.    Objective   Vital Signs:  /68 (BP Location: Left arm, Patient Position: Sitting, Cuff Size: Adult)   Pulse 67   Temp 98 °F (36.7 °C) (Temporal)   Ht 167.6 cm (66\")   Wt 66.9 kg (147 lb 6.4 oz)   SpO2 98%   BMI 23.79 kg/m²   Estimated body mass index is 23.79 kg/m² as calculated from the following:    Height as of this encounter: 167.6 cm (66\").    Weight as of this encounter: 66.9 kg (147 lb 6.4 oz).       BMI is within normal parameters. No other follow-up for BMI required.      Physical Exam  Constitutional:       Appearance: She is well-developed. She is not ill-appearing.   HENT:      Head: Normocephalic.      Right Ear: Hearing, tympanic membrane and external ear normal.      Left Ear: Hearing, tympanic membrane and external ear normal.      Nose: Nose normal. No nasal deformity, mucosal edema or rhinorrhea.      Right Sinus: No maxillary sinus tenderness or frontal sinus tenderness.      Left Sinus: No maxillary sinus tenderness or frontal sinus tenderness.      Mouth/Throat:      Dentition: Normal dentition.   Eyes:      General: Lids are normal.         Right eye: No discharge.         Left eye: No discharge.      Conjunctiva/sclera: Conjunctivae normal.      Right eye: No exudate.     Left eye: No exudate.  Neck:      Thyroid: No thyroid mass or thyromegaly.      Vascular: No carotid bruit.      Trachea: Trachea normal.   Cardiovascular:      Rate and Rhythm: Regular rhythm.      Pulses: Normal pulses.      Heart sounds: Normal heart sounds. No murmur " heard.  Pulmonary:      Effort: No respiratory distress.      Breath sounds: Normal breath sounds. No decreased breath sounds, wheezing, rhonchi or rales.   Abdominal:      General: Bowel sounds are normal.      Palpations: Abdomen is soft.      Tenderness: There is no abdominal tenderness.   Musculoskeletal:      Cervical back: Normal range of motion. No edema.   Lymphadenopathy:      Head:      Right side of head: No submental, submandibular, tonsillar, preauricular, posterior auricular or occipital adenopathy.      Left side of head: No submental, submandibular, tonsillar, preauricular, posterior auricular or occipital adenopathy.   Skin:     General: Skin is warm and dry.      Nails: There is no clubbing.   Neurological:      Mental Status: She is alert.   Psychiatric:         Behavior: Behavior is cooperative.        Result Review :  The following data was reviewed by: MARILOU Alston on 05/22/2023:  Common labs        10/31/2022    08:43 2/4/2023    07:37   Common Labs   Glucose 92      BUN 11      Creatinine 0.94      Sodium 138      Potassium 4.7      Chloride 103      Calcium 10.0      Total Protein 6.6      Albumin 4.50      Total Bilirubin 0.4      Alkaline Phosphatase 90      AST (SGOT) 14      ALT (SGPT) 14      WBC 5.23   5.68        Hemoglobin 14.6   16.1        Hematocrit 43.2   46.2        Platelets 339   200        Total Cholesterol 241      Triglycerides 88      HDL Cholesterol 59      LDL Cholesterol  167          This result is from an external source.                Assessment and Plan   Diagnoses and all orders for this visit:    1. Acquired hypothyroidism (Primary)  Assessment & Plan:  She is on Synthroid 75 mcg daily for replacement.   Lab Results   Component Value Date    TSH 1.190 10/31/2022       Orders:  -     TSH    2. Essential hypertension  Assessment & Plan:  BP is well-controlled on lisinopril which she will continue along with a low sodium diet.    Orders:  -     CBC (No  Diff)  -     Comprehensive Metabolic Panel    3. Reactive depression  Assessment & Plan:  Sx managed on sertraline daily which she is tolerating well, continue to monitor.             Follow Up   Return in about 6 months (around 11/22/2023).  Patient was given instructions and counseling regarding her condition or for health maintenance advice. Please see specific information pulled into the AVS if appropriate.

## 2023-05-30 DIAGNOSIS — E03.9 HYPOTHYROIDISM, UNSPECIFIED TYPE: ICD-10-CM

## 2023-05-30 RX ORDER — LEVOTHYROXINE SODIUM 0.07 MG/1
75 TABLET ORAL DAILY
Qty: 30 TABLET | Refills: 0 | Status: SHIPPED | OUTPATIENT
Start: 2023-05-30

## 2023-07-26 DIAGNOSIS — I10 ESSENTIAL HYPERTENSION: ICD-10-CM

## 2023-07-26 DIAGNOSIS — F32.9 REACTIVE DEPRESSION: ICD-10-CM

## 2023-07-27 RX ORDER — LISINOPRIL 5 MG/1
5 TABLET ORAL DAILY
Qty: 90 TABLET | Refills: 1 | Status: SHIPPED | OUTPATIENT
Start: 2023-07-27

## 2023-09-23 DIAGNOSIS — F32.9 REACTIVE DEPRESSION: ICD-10-CM

## 2023-10-04 ENCOUNTER — PATIENT MESSAGE (OUTPATIENT)
Dept: INTERNAL MEDICINE | Facility: CLINIC | Age: 73
End: 2023-10-04
Payer: MEDICARE

## 2023-10-04 DIAGNOSIS — E03.9 HYPOTHYROIDISM, UNSPECIFIED TYPE: ICD-10-CM

## 2023-10-04 RX ORDER — LEVOTHYROXINE SODIUM 0.07 MG/1
75 TABLET ORAL DAILY
Qty: 90 TABLET | Refills: 0 | Status: SHIPPED | OUTPATIENT
Start: 2023-10-04

## 2023-10-04 NOTE — TELEPHONE ENCOUNTER
From: Tammy Torres  To: Hina Rosen  Sent: 10/4/2023 10:22 AM EDT  Subject: 90 day med scripts    Luciano Santamaria. My goofy insurance keeps decking my refills. Amy said if we switch to 90 day supplies it will go through. Would you be okay doing 90 day scripts instead of 30? If so my latest decline is for Levothoroxine and Amy will be contacting you  Thanks  Tammy

## 2023-10-05 DIAGNOSIS — E03.9 HYPOTHYROIDISM, UNSPECIFIED TYPE: ICD-10-CM

## 2023-10-05 RX ORDER — LEVOTHYROXINE SODIUM 0.07 MG/1
75 TABLET ORAL DAILY
Qty: 90 TABLET | Refills: 0 | OUTPATIENT
Start: 2023-10-05

## 2023-10-18 ENCOUNTER — OFFICE VISIT (OUTPATIENT)
Dept: INTERNAL MEDICINE | Facility: CLINIC | Age: 73
End: 2023-10-18
Payer: MEDICARE

## 2023-10-18 VITALS
OXYGEN SATURATION: 100 % | HEIGHT: 66 IN | HEART RATE: 94 BPM | WEIGHT: 146.4 LBS | TEMPERATURE: 97.7 F | RESPIRATION RATE: 20 BRPM | BODY MASS INDEX: 23.53 KG/M2 | SYSTOLIC BLOOD PRESSURE: 122 MMHG | DIASTOLIC BLOOD PRESSURE: 80 MMHG

## 2023-10-18 DIAGNOSIS — S39.012A ACUTE MYOFASCIAL STRAIN OF LUMBAR REGION, INITIAL ENCOUNTER: Primary | ICD-10-CM

## 2023-10-18 RX ORDER — TRAMADOL HYDROCHLORIDE 50 MG/1
50 TABLET ORAL EVERY 6 HOURS PRN
Qty: 15 TABLET | Refills: 0 | Status: SHIPPED | OUTPATIENT
Start: 2023-10-18

## 2023-10-18 RX ORDER — TIZANIDINE 4 MG/1
4 TABLET ORAL EVERY 8 HOURS PRN
Qty: 30 TABLET | Refills: 0 | Status: SHIPPED | OUTPATIENT
Start: 2023-10-18

## 2023-10-18 RX ORDER — ONDANSETRON 4 MG/1
4 TABLET, FILM COATED ORAL EVERY 8 HOURS PRN
Qty: 20 TABLET | Refills: 0 | Status: SHIPPED | OUTPATIENT
Start: 2023-10-18

## 2023-10-18 RX ORDER — METHYLPREDNISOLONE 4 MG/1
TABLET ORAL
Qty: 21 TABLET | Refills: 0 | Status: SHIPPED | OUTPATIENT
Start: 2023-10-18 | End: 2023-10-24

## 2023-10-18 NOTE — ASSESSMENT & PLAN NOTE
She will begin taking Medrol to treat acute inflammation. She will begin taking a muscle relaxer as needed. She has stretching exercises, consider further evaluation and/or referral to PT if sx worsen.  The patient was encouraged to begin taking MiraLAX to avoid constipation while taking tramadol.

## 2023-10-18 NOTE — PROGRESS NOTES
"Chief Complaint  Hip Pain (Left side) and Back Pain  Subjective        Tammy Torres presents to [facility]  History of Present Illness    Tammy Torres is a 73-year-old female who presents today for low back pain. She is accompanied by her .     She began having slight left hip pain on 10/11/2023 without acute injury or trauma. On 10/16/2023 she decided to do some stretches and hip exercises which she tolerated well but by that night she began having excruciating pain. She was given tramadol at the hospital when she fractured her kneecap and did not take it, but she decided to start taking it on 10/16/2023 in the middle of the night. She has been taking the tramadol since then. The tramadol does provide some relief for the pain, but it is not full relief.     Her pain does radiate a bit into her leg. Her pain is continuous but worse when she bends forward and when she walks for long periods of time. The patient does not feel weakness in her left leg. She denies any dysuria. She was previously taking Tylenol and transitioned into tramadol after having severe pain. The patient has had testing done on her back in the past and was told she had arthritis along the top of her hips. She was evaluated by an orthopedic specialist. She also c/o muscle spasms. Denies change in bowel or bladder function.    The patient is doing well with her knee. She fractured her kneecap in 06/2023 while she was shopping.     Objective   Vital Signs:  /80 (BP Location: Left arm)   Pulse 94   Temp 97.7 °F (36.5 °C) (Temporal)   Resp 20   Ht 167.6 cm (66\")   Wt 66.4 kg (146 lb 6.4 oz)   SpO2 100%   BMI 23.63 kg/m²   Estimated body mass index is 23.63 kg/m² as calculated from the following:    Height as of this encounter: 167.6 cm (66\").    Weight as of this encounter: 66.4 kg (146 lb 6.4 oz).    BMI is within normal parameters. No other follow-up for BMI required.      Physical Exam  Vitals " and nursing note reviewed.   Constitutional:       General: She is not in acute distress.     Appearance: Normal appearance. She is well-developed. She is not diaphoretic.   HENT:      Head: Normocephalic and atraumatic.      Right Ear: Tympanic membrane, ear canal and external ear normal.      Left Ear: Tympanic membrane, ear canal and external ear normal.      Nose: Nose normal. No rhinorrhea.      Mouth/Throat:      Mouth: Mucous membranes are moist.      Pharynx: Oropharynx is clear.   Eyes:      General:         Right eye: No discharge.         Left eye: No discharge.      Conjunctiva/sclera: Conjunctivae normal.   Cardiovascular:      Rate and Rhythm: Normal rate and regular rhythm.      Pulses: Normal pulses.      Heart sounds: Normal heart sounds.   Pulmonary:      Effort: Pulmonary effort is normal.      Breath sounds: Normal breath sounds.   Abdominal:      General: Bowel sounds are normal. There is no distension.      Palpations: Abdomen is soft.      Tenderness: There is no abdominal tenderness.   Musculoskeletal:         General: No swelling or tenderness.      Cervical back: Normal range of motion.      Lumbar back: Spasms and tenderness present. No swelling. Decreased range of motion.      Comments: Moderate tenderness to palpation of paravertebral muscles of lumbar area     Skin:     General: Skin is warm and dry.      Findings: No erythema.   Neurological:      General: No focal deficit present.      Mental Status: She is alert and oriented to person, place, and time.      Sensory: No sensory deficit.      Motor: No abnormal muscle tone (5/5 normal muscle strength bilateral lower extremities. Strength and tone 5/5 in lower extremities (right quadriceps, left quadriceps, right tibialis anterior, left tibialis anterior)).      Coordination: Coordination normal.      Gait: Gait abnormal (Antalgic. Is able to do heel and toe walking bilaterally.).      Deep Tendon Reflexes:      Reflex Scores:        Patellar reflexes are 2+ on the right side and 2+ on the left side.       Achilles reflexes are 2+ on the right side and 2+ on the left side.     Comments: Negative straight leg raising test   Psychiatric:         Mood and Affect: Mood normal.         Behavior: Behavior normal.         Judgment: Judgment normal.        Result Review :  The following data was reviewed by: MARILOU Alston on 10/18/2023:  Common labs          2/4/2023    07:37 5/22/2023    16:13   Common Labs   Glucose  106    BUN  15    Creatinine  1.01    Sodium  139    Potassium  5.0    Chloride  103    Calcium  9.9    Total Protein  6.9    Albumin  4.4    Total Bilirubin  0.3    Alkaline Phosphatase  84    AST (SGOT)  17    ALT (SGPT)  14    WBC 5.68     5.85    Hemoglobin 16.1     14.3    Hematocrit 46.2     43.2    Platelets 200     333       Details          This result is from an external source.             Data reviewed : Recent hospitalization notes ER 6/28/23           Assessment and Plan   Diagnoses and all orders for this visit:    1. Acute myofascial strain of lumbar region, initial encounter (Primary)  Assessment & Plan:  She will begin taking Medrol to treat acute inflammation. She will begin taking a muscle relaxer as needed. She has stretching exercises, consider further evaluation and/or referral to PT if sx worsen.  The patient was encouraged to begin taking MiraLAX to avoid constipation while taking tramadol.    Orders:  -     methylPREDNISolone (Medrol) 4 MG dose pack; follow package directions  Dispense: 21 tablet; Refill: 0  -     traMADol (ULTRAM) 50 MG tablet; Take 1 tablet by mouth Every 6 (Six) Hours As Needed for Moderate Pain.  Dispense: 15 tablet; Refill: 0  -     ondansetron (ZOFRAN) 4 MG tablet; Take 1 tablet by mouth Every 8 (Eight) Hours As Needed for Nausea or Vomiting.  Dispense: 20 tablet; Refill: 0  -     tiZANidine (ZANAFLEX) 4 MG tablet; Take 1 tablet by mouth Every 8 (Eight) Hours As Needed for Muscle  Spasms.  Dispense: 30 tablet; Refill: 0           Follow Up   Return if symptoms worsen or fail to improve, for Next scheduled follow up.  Patient was given instructions and counseling regarding her condition or for health maintenance advice. Please see specific information pulled into the AVS if appropriate.       Transcribed from ambient dictation for MARILOU Alston by Suad Tapia.  10/18/23   15:47 EDT    Patient or patient representative verbalized consent to the visit recording.  I have personally performed the services described in this document as transcribed by the above individual, and it is both accurate and complete.     normal (ped)...

## 2023-11-20 DIAGNOSIS — F32.9 REACTIVE DEPRESSION: ICD-10-CM

## 2023-12-27 ENCOUNTER — OFFICE VISIT (OUTPATIENT)
Dept: INTERNAL MEDICINE | Facility: CLINIC | Age: 73
End: 2023-12-27
Payer: MEDICARE

## 2023-12-27 VITALS
HEIGHT: 66 IN | OXYGEN SATURATION: 95 % | HEART RATE: 78 BPM | TEMPERATURE: 97.9 F | SYSTOLIC BLOOD PRESSURE: 134 MMHG | DIASTOLIC BLOOD PRESSURE: 82 MMHG | BODY MASS INDEX: 23.33 KG/M2 | WEIGHT: 145.2 LBS

## 2023-12-27 DIAGNOSIS — J06.9 ACUTE URI: ICD-10-CM

## 2023-12-27 DIAGNOSIS — E03.9 ACQUIRED HYPOTHYROIDISM: Chronic | ICD-10-CM

## 2023-12-27 DIAGNOSIS — I10 ESSENTIAL HYPERTENSION: Chronic | ICD-10-CM

## 2023-12-27 DIAGNOSIS — Z00.00 MEDICARE ANNUAL WELLNESS VISIT, SUBSEQUENT: Primary | ICD-10-CM

## 2023-12-27 PROBLEM — S39.012A ACUTE LUMBAR MYOFASCIAL STRAIN: Status: RESOLVED | Noted: 2023-10-18 | Resolved: 2023-12-27

## 2023-12-27 RX ORDER — BENZONATATE 200 MG/1
200 CAPSULE ORAL 3 TIMES DAILY PRN
Qty: 30 CAPSULE | Refills: 0 | Status: SHIPPED | OUTPATIENT
Start: 2023-12-27

## 2023-12-27 RX ORDER — UBIDECARENONE 75 MG
50 CAPSULE ORAL DAILY
Start: 2023-12-27

## 2023-12-27 RX ORDER — AZITHROMYCIN 250 MG/1
TABLET, FILM COATED ORAL
Qty: 6 TABLET | Refills: 0 | Status: SHIPPED | OUTPATIENT
Start: 2023-12-27 | End: 2024-01-02

## 2023-12-27 RX ORDER — NICOTINE 14MG/24HR
1 PATCH, TRANSDERMAL 24 HOURS TRANSDERMAL DAILY
Start: 2023-12-27

## 2023-12-27 RX ORDER — MULTIVIT-MIN/IRON/FOLIC ACID/K 18-600-40
2000 CAPSULE ORAL DAILY
Start: 2023-12-27

## 2023-12-27 NOTE — PROGRESS NOTES
The ABCs of the Annual Wellness Visit  Subsequent Medicare Wellness Visit    Subjective    Tammy Torres is a 73 y.o. female who presents for a Subsequent Medicare Wellness Visit.    The following portions of the patient's history were reviewed and   updated as appropriate: allergies, current medications, past family history, past medical history, past social history, past surgical history, and problem list.    Compared to one year ago, the patient feels her physical   health is the same.    Compared to one year ago, the patient feels her mental   health is the same.    Recent Hospitalizations:  She was not admitted to the hospital during the last year.       Current Medical Providers:  Patient Care Team:  Hina Rosen APRN as PCP - General (Internal Medicine)    Outpatient Medications Prior to Visit   Medication Sig Dispense Refill    BIOTIN PO Take  by mouth.      COLLAGEN PO Take  by mouth.      levothyroxine (SYNTHROID, LEVOTHROID) 75 MCG tablet Take 1 tablet by mouth Daily. 90 tablet 0    lisinopril (PRINIVIL,ZESTRIL) 5 MG tablet TAKE 1 TABLET BY MOUTH DAILY 90 tablet 1    QUEtiapine (SEROquel) 25 MG tablet TAKE 1 TABLET BY MOUTH AT NIGHT AS NEEDED FOR INSOMNIA 90 tablet 1    sertraline (ZOLOFT) 50 MG tablet TAKE 1 AND 1/2 TABLETS BY MOUTH DAILY 45 tablet 1    TURMERIC PO Take  by mouth.      ondansetron (ZOFRAN) 4 MG tablet Take 1 tablet by mouth Every 8 (Eight) Hours As Needed for Nausea or Vomiting. 20 tablet 0    tiZANidine (ZANAFLEX) 4 MG tablet Take 1 tablet by mouth Every 8 (Eight) Hours As Needed for Muscle Spasms. 30 tablet 0    traMADol (ULTRAM) 50 MG tablet Take 1 tablet by mouth Every 6 (Six) Hours As Needed for Moderate Pain. 15 tablet 0     No facility-administered medications prior to visit.       No opioid medication identified on active medication list. I have reviewed chart for other potential  high risk medication/s and harmful drug interactions in the elderly.        Aspirin is not on  "active medication list.  Aspirin use is not indicated based on review of current medical condition/s. Risk of harm outweighs potential benefits.  .    Patient Active Problem List   Diagnosis    Depression    Essential hypertension    Acquired hypothyroidism    Insomnia    Rosacea     Advance Care Planning   Advance Care Planning     Advance Directive is not on file.  ACP discussion was held with the patient during this visit. Patient has an advance directive (not in EMR), copy requested.     Objective    Vitals:    23 1503   BP: 134/82   BP Location: Right arm   Patient Position: Sitting   Cuff Size: Adult   Pulse: 78   Temp: 97.9 °F (36.6 °C)   SpO2: 95%   Weight: 65.9 kg (145 lb 3.2 oz)   Height: 167.6 cm (66\")     Estimated body mass index is 23.44 kg/m² as calculated from the following:    Height as of this encounter: 167.6 cm (66\").    Weight as of this encounter: 65.9 kg (145 lb 3.2 oz).    BMI is within normal parameters. No other follow-up for BMI required.      Does the patient have evidence of cognitive impairment? No          HEALTH RISK ASSESSMENT    Smoking Status:  Social History     Tobacco Use   Smoking Status Never   Smokeless Tobacco Never     Alcohol Consumption:  Social History     Substance and Sexual Activity   Alcohol Use Yes    Comment: Wine or drink once a month or less     Fall Risk Screen:    PINKY Fall Risk Assessment was completed, and patient is at HIGH risk for falls. Assessment completed on:2023    Depression Screenin/27/2023     3:07 PM   PHQ-2/PHQ-9 Depression Screening   Little Interest or Pleasure in Doing Things 0-->not at all   Feeling Down, Depressed or Hopeless 0-->not at all   PHQ-9: Brief Depression Severity Measure Score 0       Health Habits and Functional and Cognitive Screenin/27/2023     3:06 PM   Functional & Cognitive Status   Do you have difficulty preparing food and eating? No   Do you have difficulty bathing yourself, getting " dressed or grooming yourself? No   Do you have difficulty using the toilet? No   Do you have difficulty moving around from place to place? No   Do you have trouble with steps or getting out of a bed or a chair? No   Current Diet Well Balanced Diet   Dental Exam Up to date   Eye Exam Up to date   Exercise (times per week) 2 times per week   Current Exercises Include No Regular Exercise;Treadmill;Walking   Do you need help using the phone?  No   Are you deaf or do you have serious difficulty hearing?  No   Do you need help to go to places out of walking distance? No   Do you need help shopping? No   Do you need help preparing meals?  No   Do you need help with housework?  No   Do you need help with laundry? No   Do you need help taking your medications? No   Do you need help managing money? No   Do you ever drive or ride in a car without wearing a seat belt? No   Have you felt unusual stress, anger or loneliness in the last month? No   Who do you live with? Spouse   If you need help, do you have trouble finding someone available to you? No   Have you been bothered in the last four weeks by sexual problems? No   Do you have difficulty concentrating, remembering or making decisions? No       Age-appropriate Screening Schedule:  Refer to the list below for future screening recommendations based on patient's age, sex and/or medical conditions. Orders for these recommended tests are listed in the plan section. The patient has been provided with a written plan.    Health Maintenance   Topic Date Due    TDAP/TD VACCINES (1 - Tdap) Never done    MAMMOGRAM  10/05/2023    ANNUAL WELLNESS VISIT  10/11/2023    LIPID PANEL  10/31/2023    ZOSTER VACCINE (1 of 2) 11/22/2025 (Originally 7/5/2000)    Pneumococcal Vaccine 65+ (1 of 1 - PCV) 11/22/2026 (Originally 7/5/2015)    COLORECTAL CANCER SCREENING  05/18/2025    DXA SCAN  07/20/2026    HEPATITIS C SCREENING  Completed    COVID-19 Vaccine  Completed    INFLUENZA VACCINE  Completed  "   PAP SMEAR  Discontinued                  CMS Preventative Services Quick Reference  Risk Factors Identified During Encounter  Immunizations Discussed/Encouraged: Tdap, Prevnar 20 (Pneumococcal 20-valent conjugate), and Shingrix  Breast cancer screening  She has declined Prevnar 20 and Shingrix despite known risks    The above risks/problems have been discussed with the patient.  Pertinent information has been shared with the patient in the After Visit Summary.  An After Visit Summary and PPPS were made available to the patient.    Follow Up:   Next Medicare Wellness visit to be scheduled in 1 year.       Additional E&M Note during same encounter follows:  Patient has multiple medical problems which are significant and separately identifiable that require additional work above and beyond the Medicare Wellness Visit.      Chief Complaint  Medicare Wellness-subsequent, Cough, and URI    Subjective        HPI  Tammy Torres is also being seen today for respiratory symptoms.    She c/o increased congestion and drainage for the past 2 weeks without fever or chills.  She does have a history of allergies.  Denies dyspnea.  She has been taking over-the-counter Mucinex for increased drainage and cough with mild relief.  She states her  sputum as well as rhinorrhea have become more purulent over the past couple days.  She does report increased fatigue.       Objective   Vital Signs:  /82 (BP Location: Right arm, Patient Position: Sitting, Cuff Size: Adult)   Pulse 78   Temp 97.9 °F (36.6 °C)   Ht 167.6 cm (66\")   Wt 65.9 kg (145 lb 3.2 oz)   SpO2 95%   BMI 23.44 kg/m²     Physical Exam  Constitutional:       Appearance: She is well-developed. She is not ill-appearing.   HENT:      Head: Normocephalic.      Right Ear: Hearing, tympanic membrane and external ear normal. Tympanic membrane is bulging.      Left Ear: Hearing, tympanic membrane and external ear normal. Tympanic membrane is bulging.      Nose: Nose " normal. No nasal deformity, mucosal edema or rhinorrhea.      Right Sinus: No maxillary sinus tenderness or frontal sinus tenderness.      Left Sinus: No maxillary sinus tenderness or frontal sinus tenderness.      Mouth/Throat:      Dentition: Normal dentition.      Pharynx: Posterior oropharyngeal erythema present.   Eyes:      General: Lids are normal.         Right eye: No discharge.         Left eye: No discharge.      Conjunctiva/sclera: Conjunctivae normal.      Right eye: No exudate.     Left eye: No exudate.  Neck:      Thyroid: No thyroid mass or thyromegaly.      Vascular: No carotid bruit.      Trachea: Trachea normal.   Cardiovascular:      Rate and Rhythm: Regular rhythm.      Pulses: Normal pulses.      Heart sounds: Normal heart sounds. No murmur heard.  Pulmonary:      Effort: No respiratory distress.      Breath sounds: Normal breath sounds. No decreased breath sounds, wheezing, rhonchi or rales.   Abdominal:      General: Bowel sounds are normal.      Palpations: Abdomen is soft.      Tenderness: There is no abdominal tenderness.   Musculoskeletal:      Cervical back: Normal range of motion. No edema.   Lymphadenopathy:      Head:      Right side of head: No submental, submandibular, tonsillar, preauricular, posterior auricular or occipital adenopathy.      Left side of head: No submental, submandibular, tonsillar, preauricular, posterior auricular or occipital adenopathy.      Cervical: No cervical adenopathy.   Skin:     General: Skin is warm and dry.      Nails: There is no clubbing.   Neurological:      Mental Status: She is alert.   Psychiatric:         Behavior: Behavior is cooperative.          The following data was reviewed by: MARILOU Alston on 12/27/2023:  Common labs          2/4/2023    07:37 5/22/2023    16:13   Common Labs   Glucose  106    Glucose 90        BUN 12     15    Creatinine 0.84     1.01    EGFR African Am >60        Sodium 135     139    Potassium 4.5     5.0     Chloride 104     103    Calcium 9.2     9.9    Total Protein  6.9    Albumin 4.0     4.4    Total Bilirubin 0.5     0.3    Alkaline Phosphatase 85     84    AST (SGOT) 21     17    ALT (SGPT) 14     14    WBC 5.68     5.85    Hemoglobin 16.1     14.3    Hematocrit 46.2     43.2    Platelets 200     333       Details          This result is from an external source.                        Assessment and Plan   Diagnoses and all orders for this visit:    1. Medicare annual wellness visit, subsequent (Primary)    2. Acquired hypothyroidism  Assessment & Plan:  She will continue Synthroid 75 mcg daily for replacement, recheck TSH with next labs.      3. Acute URI  Comments:  Will treat with a Zpak due to lingering & worsening sx, may use Tessalon as needed for dry cough. RTC if sx worsen.  Orders:  -     azithromycin (Zithromax Z-Jeferson) 250 MG tablet; Take 2 tablets the first day, then 1 tablet daily for 4 days.  Dispense: 6 tablet; Refill: 0  -     benzonatate (TESSALON) 200 MG capsule; Take 1 capsule by mouth 3 (Three) Times a Day As Needed for Cough.  Dispense: 30 capsule; Refill: 0    4. Essential hypertension  Assessment & Plan:  BP remains well-controlled, continue lisinopril daily.      Other orders  -     Cholecalciferol (Vitamin D) 50 MCG (2000 UT) capsule; Take 1 capsule by mouth Daily.  -     Calcium 600-10 MG-MCG chewable tablet; Chew 600 mg Daily.  -     Saccharomyces boulardii (Probiotic) 250 MG capsule; Take 250 mg by mouth Daily.  -     vitamin B-12 (CYANOCOBALAMIN) 100 MCG tablet; Take 0.5 tablets by mouth Daily.             Follow Up   Return if symptoms worsen or fail to improve, for Next scheduled follow up.  Patient was given instructions and counseling regarding her condition or for health maintenance advice. Please see specific information pulled into the AVS if appropriate.

## 2023-12-29 NOTE — PATIENT INSTRUCTIONS
Medicare Wellness  Personal Prevention Plan of Service     Date of Office Visit:    Encounter Provider:  MARILOU Alston  Place of Service:  [facility]  Patient Name: Tammy Torres  :  1950    As part of the Medicare Wellness portion of your visit today, we are providing you with this personalized preventive plan of services (PPPS). This plan is based upon recommendations of the [place] Preventive Services Task Force (USPSTF) and the Advisory Committee on Immunization Practices (ACIP).    This lists the preventive care services that should be considered, and provides dates of when you are due. Items listed as completed are up-to-date and do not require any further intervention.    Health Maintenance   Topic Date Due    TDAP/TD VACCINES (1 - Tdap) Never done    MAMMOGRAM  10/05/2023    ANNUAL WELLNESS VISIT  10/11/2023    LIPID PANEL  10/31/2023    ZOSTER VACCINE (1 of 2) 2025 (Originally 2000)    Pneumococcal Vaccine 65+ (1 of 1 - PCV) 2026 (Originally 2015)    COLORECTAL CANCER SCREENING  2025    DXA SCAN  2026    HEPATITIS C SCREENING  Completed    COVID-19 Vaccine  Completed    INFLUENZA VACCINE  Completed    PAP SMEAR  Discontinued       No orders of the defined types were placed in this encounter.      Return if symptoms worsen or fail to improve, for Next scheduled follow up.

## 2024-01-08 ENCOUNTER — PATIENT MESSAGE (OUTPATIENT)
Dept: INTERNAL MEDICINE | Facility: CLINIC | Age: 74
End: 2024-01-08
Payer: MEDICARE

## 2024-01-08 DIAGNOSIS — J06.9 ACUTE URI: ICD-10-CM

## 2024-01-08 DIAGNOSIS — G47.00 INSOMNIA, UNSPECIFIED TYPE: ICD-10-CM

## 2024-01-08 RX ORDER — QUETIAPINE FUMARATE 25 MG/1
TABLET, FILM COATED ORAL
Qty: 90 TABLET | Refills: 1 | Status: SHIPPED | OUTPATIENT
Start: 2024-01-08

## 2024-01-08 NOTE — TELEPHONE ENCOUNTER
From: Tammy Torres  To: Hina Rosen  Sent: 1/8/2024 10:24 AM EST  Subject: Follow up    Luciano Santamaria  I’m definitely on the mend. I am feeling much better, but I still have the cough. Would it be possible to refill the Benzonatate?

## 2024-01-08 NOTE — TELEPHONE ENCOUNTER
Rx Refill Note  Requested Prescriptions     Pending Prescriptions Disp Refills    QUEtiapine (SEROquel) 25 MG tablet [Pharmacy Med Name: QUETIAPINE 25MG TABLETS] 90 tablet 1     Sig: TAKE 1 TABLET BY MOUTH AT NIGHT AS NEEDED FOR INSOMNIA      Last office visit with prescribing clinician: 12/27/2023   Last telemedicine visit with prescribing clinician: Visit date not found   Next office visit with prescribing clinician: 1/31/2024                         Would you like a call back once the refill request has been completed: [] Yes [] No    If the office needs to give you a call back, can they leave a voicemail: [] Yes [] No    Katie Evans  01/08/24, 08:44 EST

## 2024-01-09 RX ORDER — BENZONATATE 200 MG/1
200 CAPSULE ORAL 3 TIMES DAILY PRN
Qty: 30 CAPSULE | Refills: 0 | Status: SHIPPED | OUTPATIENT
Start: 2024-01-09

## 2024-01-11 DIAGNOSIS — E03.9 HYPOTHYROIDISM, UNSPECIFIED TYPE: ICD-10-CM

## 2024-01-11 RX ORDER — LEVOTHYROXINE SODIUM 0.07 MG/1
75 TABLET ORAL DAILY
Qty: 90 TABLET | Refills: 0 | Status: SHIPPED | OUTPATIENT
Start: 2024-01-11

## 2024-01-17 DIAGNOSIS — I10 ESSENTIAL HYPERTENSION: ICD-10-CM

## 2024-01-17 RX ORDER — LISINOPRIL 5 MG/1
5 TABLET ORAL DAILY
Qty: 90 TABLET | Refills: 1 | Status: SHIPPED | OUTPATIENT
Start: 2024-01-17

## 2024-02-16 DIAGNOSIS — F32.9 REACTIVE DEPRESSION: ICD-10-CM

## 2024-03-15 ENCOUNTER — OFFICE VISIT (OUTPATIENT)
Dept: INTERNAL MEDICINE | Facility: CLINIC | Age: 74
End: 2024-03-15
Payer: MEDICARE

## 2024-03-15 VITALS
DIASTOLIC BLOOD PRESSURE: 70 MMHG | SYSTOLIC BLOOD PRESSURE: 128 MMHG | OXYGEN SATURATION: 96 % | HEART RATE: 72 BPM | HEIGHT: 66 IN | BODY MASS INDEX: 23.98 KG/M2 | WEIGHT: 149.2 LBS

## 2024-03-15 DIAGNOSIS — Z12.31 ENCOUNTER FOR SCREENING MAMMOGRAM FOR MALIGNANT NEOPLASM OF BREAST: ICD-10-CM

## 2024-03-15 DIAGNOSIS — E03.9 ACQUIRED HYPOTHYROIDISM: Chronic | ICD-10-CM

## 2024-03-15 DIAGNOSIS — I10 ESSENTIAL HYPERTENSION: Primary | Chronic | ICD-10-CM

## 2024-03-15 DIAGNOSIS — F32.9 REACTIVE DEPRESSION: Chronic | ICD-10-CM

## 2024-03-17 NOTE — ASSESSMENT & PLAN NOTE
Hypertension is stable and controlled  Continue current treatment regimen.  Blood pressure will be reassessed in 6 months.  She will continue lisinopril along with a low sodium diet.

## 2024-03-17 NOTE — ASSESSMENT & PLAN NOTE
She is managed on Synthroid 75 mcg daily for replacement-TSH 1.13 with 11/2023 labs. Recheck labs prior to next appointment.

## 2024-03-17 NOTE — ASSESSMENT & PLAN NOTE
Patient's depression is a single episode that is mild without psychosis. Depression is in full remission and stable.    Plan:   Continue current medication therapy   Continue sertraline which she is tolerating well.    Followup in 6 months.

## 2024-03-17 NOTE — PROGRESS NOTES
"Chief Complaint  Hypothyroidism (6 month follow up), Insomnia, and Anxiety  Subjective        Tammy Torres presents to [facility]  History of Present Illness  History of Present Illness  The patient is a 73-year-old female who presents today for follow-up.    She has a lot of drainage in the back of her throat. Her bowel movements are regular. She very seldom has heartburn or indigestion. She is sleeping well. Her medicine (Seroquel) is working well for her.     She was seen at [facility] 6/2023 due to left knee pain after a fall, dx with left distal patellar fracture. She states her pain has improved overall, still with intermittent tenderness.    Objective   Vital Signs:  /70 (BP Location: Left arm, Patient Position: Sitting, Cuff Size: Adult)   Pulse 72   Ht 167.6 cm (66\")   Wt 67.7 kg (149 lb 3.2 oz)   SpO2 96%   BMI 24.08 kg/m²   Estimated body mass index is 24.08 kg/m² as calculated from the following:    Height as of this encounter: 167.6 cm (66\").    Weight as of this encounter: 67.7 kg (149 lb 3.2 oz).    BMI is within normal parameters. No other follow-up for BMI required.      Physical Exam   Physical Exam  Vital Signs  Blood pressure is 128/80.    Result Review :  The following data was reviewed by: MARILOU Alston on 03/15/2024:  Common labs          5/22/2023    16:13   Common Labs   Glucose 106    BUN 15    Creatinine 1.01    Sodium 139    Potassium 5.0    Chloride 103    Calcium 9.9    Total Protein 6.9    Albumin 4.4    Total Bilirubin 0.3    Alkaline Phosphatase 84    AST (SGOT) 17    ALT (SGPT) 14    WBC 5.85    Hemoglobin 14.3    Hematocrit 43.2    Platelets 333           Results  Laboratory Studies  TSH is 1.1.             Assessment and Plan   Diagnoses and all orders for this visit:    1. Essential hypertension (Primary)  Assessment & Plan:  Hypertension is stable and controlled  Continue current treatment regimen.  Blood pressure will be " reassessed in 6 months.  She will continue lisinopril along with a low sodium diet.    Orders:  -     CBC (No Diff)  -     Comprehensive Metabolic Panel  -     Lipid Panel    2. Acquired hypothyroidism  Assessment & Plan:  She is managed on Synthroid 75 mcg daily for replacement-TSH 1.13 with 11/2023 labs. Recheck labs prior to next appointment.    Orders:  -     TSH    3. Reactive depression  Assessment & Plan:  Patient's depression is a single episode that is mild without psychosis. Depression is in full remission and stable.    Plan:   Continue current medication therapy   Continue sertraline which she is tolerating well.    Followup in 6 months.       4. Encounter for screening mammogram for malignant neoplasm of breast  -     Mammo Screening Digital Tomosynthesis Bilateral With CAD; Future      Assessment & Plan  1. Health maintenance.  Her blood pressure is stable. An order was placed for a mammogram. An order was placed for blood work.         Follow Up   Return in about 6 months (around 9/15/2024) for wellness.  Patient was given instructions and counseling regarding her condition or for health maintenance advice. Please see specific information pulled into the AVS if appropriate.     Patient or patient representative verbalized consent for the use of Ambient Listening during the visit with  MARILOU Alston for chart documentation. 3/17/2024  07:43 EDT  Answers submitted by the patient for this visit:  Other (Submitted on 1/30/2024)  Please describe your symptoms.: This is a wellness checkup  Have you had these symptoms before?: No  How long have you been having these symptoms?: 1-4 days  Primary Reason for Visit (Submitted on 1/30/2024)  What is the primary reason for your visit?: Other

## 2024-04-08 DIAGNOSIS — E03.9 HYPOTHYROIDISM, UNSPECIFIED TYPE: ICD-10-CM

## 2024-04-08 RX ORDER — LEVOTHYROXINE SODIUM 0.07 MG/1
75 TABLET ORAL DAILY
Qty: 90 TABLET | Refills: 1 | Status: SHIPPED | OUTPATIENT
Start: 2024-04-08

## 2024-04-14 DIAGNOSIS — F32.9 REACTIVE DEPRESSION: ICD-10-CM

## 2024-05-09 ENCOUNTER — PATIENT MESSAGE (OUTPATIENT)
Dept: INTERNAL MEDICINE | Facility: CLINIC | Age: 74
End: 2024-05-09
Payer: MEDICARE

## 2024-05-13 ENCOUNTER — PATIENT MESSAGE (OUTPATIENT)
Dept: INTERNAL MEDICINE | Facility: CLINIC | Age: 74
End: 2024-05-13
Payer: MEDICARE

## 2024-05-31 ENCOUNTER — TELEPHONE (OUTPATIENT)
Dept: INTERNAL MEDICINE | Facility: CLINIC | Age: 74
End: 2024-05-31
Payer: MEDICARE

## 2024-05-31 NOTE — TELEPHONE ENCOUNTER
Attempted to call patient regarding overdue mammogram order. LVM per  verbal supplying scheduling department's contact number

## 2024-06-11 DIAGNOSIS — F32.9 REACTIVE DEPRESSION: ICD-10-CM

## 2024-07-06 DIAGNOSIS — G47.00 INSOMNIA, UNSPECIFIED TYPE: ICD-10-CM

## 2024-07-08 RX ORDER — QUETIAPINE FUMARATE 25 MG/1
TABLET, FILM COATED ORAL
Qty: 90 TABLET | Refills: 1 | Status: SHIPPED | OUTPATIENT
Start: 2024-07-08

## 2024-07-20 DIAGNOSIS — I10 ESSENTIAL HYPERTENSION: ICD-10-CM

## 2024-07-22 ENCOUNTER — TRANSCRIBE ORDERS (OUTPATIENT)
Dept: ADMINISTRATIVE | Facility: HOSPITAL | Age: 74
End: 2024-07-22
Payer: MEDICARE

## 2024-07-22 DIAGNOSIS — Z12.31 VISIT FOR SCREENING MAMMOGRAM: Primary | ICD-10-CM

## 2024-07-22 RX ORDER — LISINOPRIL 5 MG/1
5 TABLET ORAL DAILY
Qty: 90 TABLET | Refills: 1 | Status: SHIPPED | OUTPATIENT
Start: 2024-07-22

## 2024-08-13 ENCOUNTER — HOSPITAL ENCOUNTER (OUTPATIENT)
Dept: MAMMOGRAPHY | Facility: HOSPITAL | Age: 74
Discharge: HOME | End: 2024-08-13
Admitting: NURSE PRACTITIONER
Payer: MEDICARE

## 2024-08-13 DIAGNOSIS — Z12.31 VISIT FOR SCREENING MAMMOGRAM: ICD-10-CM

## 2024-08-13 PROCEDURE — 77067 SCR MAMMO BI INCL CAD: CPT

## 2024-08-13 PROCEDURE — 77063 BREAST TOMOSYNTHESIS BI: CPT

## 2024-08-16 DIAGNOSIS — F32.9 REACTIVE DEPRESSION: ICD-10-CM

## 2024-09-17 ENCOUNTER — OFFICE VISIT (OUTPATIENT)
Dept: INTERNAL MEDICINE | Facility: CLINIC | Age: 74
End: 2024-09-17
Payer: MEDICARE

## 2024-09-17 VITALS
TEMPERATURE: 97.8 F | OXYGEN SATURATION: 95 % | DIASTOLIC BLOOD PRESSURE: 70 MMHG | HEIGHT: 66 IN | SYSTOLIC BLOOD PRESSURE: 136 MMHG | HEART RATE: 73 BPM | BODY MASS INDEX: 24.85 KG/M2 | WEIGHT: 154.6 LBS

## 2024-09-17 DIAGNOSIS — J30.2 SEASONAL ALLERGIC RHINITIS, UNSPECIFIED TRIGGER: ICD-10-CM

## 2024-09-17 DIAGNOSIS — F32.9 REACTIVE DEPRESSION: Chronic | ICD-10-CM

## 2024-09-17 DIAGNOSIS — I10 ESSENTIAL HYPERTENSION: Primary | Chronic | ICD-10-CM

## 2024-09-17 DIAGNOSIS — F51.01 PRIMARY INSOMNIA: Chronic | ICD-10-CM

## 2024-09-17 DIAGNOSIS — E03.9 ACQUIRED HYPOTHYROIDISM: Chronic | ICD-10-CM

## 2024-09-17 DIAGNOSIS — M85.80 OSTEOPENIA, UNSPECIFIED LOCATION: ICD-10-CM

## 2024-09-17 PROCEDURE — G2211 COMPLEX E/M VISIT ADD ON: HCPCS | Performed by: NURSE PRACTITIONER

## 2024-09-17 PROCEDURE — 1159F MED LIST DOCD IN RCRD: CPT | Performed by: NURSE PRACTITIONER

## 2024-09-17 PROCEDURE — 3078F DIAST BP <80 MM HG: CPT | Performed by: NURSE PRACTITIONER

## 2024-09-17 PROCEDURE — 1126F AMNT PAIN NOTED NONE PRSNT: CPT | Performed by: NURSE PRACTITIONER

## 2024-09-17 PROCEDURE — 3075F SYST BP GE 130 - 139MM HG: CPT | Performed by: NURSE PRACTITIONER

## 2024-09-17 PROCEDURE — 1160F RVW MEDS BY RX/DR IN RCRD: CPT | Performed by: NURSE PRACTITIONER

## 2024-09-17 PROCEDURE — 99214 OFFICE O/P EST MOD 30 MIN: CPT | Performed by: NURSE PRACTITIONER

## 2024-09-18 LAB
25(OH)D3+25(OH)D2 SERPL-MCNC: 36.3 NG/ML (ref 30–100)
ALBUMIN SERPL-MCNC: 4.3 G/DL (ref 3.5–5.2)
ALBUMIN/GLOB SERPL: 1.8 G/DL
ALP SERPL-CCNC: 83 U/L (ref 39–117)
ALT SERPL-CCNC: 11 U/L (ref 1–33)
AST SERPL-CCNC: 18 U/L (ref 1–32)
BILIRUB SERPL-MCNC: 0.3 MG/DL (ref 0–1.2)
BUN SERPL-MCNC: 10 MG/DL (ref 8–23)
BUN/CREAT SERPL: 11.1 (ref 7–25)
CALCIUM SERPL-MCNC: 9.5 MG/DL (ref 8.6–10.5)
CHLORIDE SERPL-SCNC: 99 MMOL/L (ref 98–107)
CHOLEST SERPL-MCNC: 230 MG/DL (ref 0–200)
CO2 SERPL-SCNC: 25 MMOL/L (ref 22–29)
CREAT SERPL-MCNC: 0.9 MG/DL (ref 0.57–1)
EGFRCR SERPLBLD CKD-EPI 2021: 67.2 ML/MIN/1.73
ERYTHROCYTE [DISTWIDTH] IN BLOOD BY AUTOMATED COUNT: 13.7 % (ref 12.3–15.4)
GLOBULIN SER CALC-MCNC: 2.4 GM/DL
GLUCOSE SERPL-MCNC: 127 MG/DL (ref 65–99)
HCT VFR BLD AUTO: 42.7 % (ref 34–46.6)
HDLC SERPL-MCNC: 54 MG/DL (ref 40–60)
HGB BLD-MCNC: 13.7 G/DL (ref 12–15.9)
LDLC SERPL CALC-MCNC: 148 MG/DL (ref 0–100)
MCH RBC QN AUTO: 28.9 PG (ref 26.6–33)
MCHC RBC AUTO-ENTMCNC: 32.1 G/DL (ref 31.5–35.7)
MCV RBC AUTO: 90.1 FL (ref 79–97)
PLATELET # BLD AUTO: 334 10*3/MM3 (ref 140–450)
POTASSIUM SERPL-SCNC: 5 MMOL/L (ref 3.5–5.2)
PROT SERPL-MCNC: 6.7 G/DL (ref 6–8.5)
RBC # BLD AUTO: 4.74 10*6/MM3 (ref 3.77–5.28)
SODIUM SERPL-SCNC: 135 MMOL/L (ref 136–145)
TRIGL SERPL-MCNC: 154 MG/DL (ref 0–150)
TSH SERPL DL<=0.005 MIU/L-ACNC: 1.13 UIU/ML (ref 0.27–4.2)
VLDLC SERPL CALC-MCNC: 28 MG/DL (ref 5–40)
WBC # BLD AUTO: 5.43 10*3/MM3 (ref 3.4–10.8)

## 2024-09-28 PROBLEM — J30.2 SEASONAL ALLERGIC RHINITIS: Status: ACTIVE | Noted: 2024-09-28

## 2024-09-28 PROBLEM — J30.2 SEASONAL ALLERGIC RHINITIS: Chronic | Status: ACTIVE | Noted: 2024-09-28

## 2024-10-15 DIAGNOSIS — F32.9 REACTIVE DEPRESSION: ICD-10-CM

## 2024-11-19 DIAGNOSIS — E03.9 HYPOTHYROIDISM, UNSPECIFIED TYPE: ICD-10-CM

## 2024-11-19 RX ORDER — LEVOTHYROXINE SODIUM 75 UG/1
75 TABLET ORAL DAILY
Qty: 90 TABLET | Refills: 1 | OUTPATIENT
Start: 2024-11-19

## 2024-11-19 RX ORDER — LEVOTHYROXINE SODIUM 75 UG/1
75 TABLET ORAL DAILY
Qty: 90 TABLET | Refills: 1 | Status: SHIPPED | OUTPATIENT
Start: 2024-11-19

## 2025-01-08 DIAGNOSIS — G47.00 INSOMNIA, UNSPECIFIED TYPE: ICD-10-CM

## 2025-01-08 RX ORDER — QUETIAPINE FUMARATE 25 MG/1
TABLET, FILM COATED ORAL
Qty: 90 TABLET | Refills: 0 | Status: SHIPPED | OUTPATIENT
Start: 2025-01-08

## 2025-01-18 DIAGNOSIS — I10 ESSENTIAL HYPERTENSION: ICD-10-CM

## 2025-01-20 RX ORDER — LISINOPRIL 5 MG/1
5 TABLET ORAL DAILY
Qty: 90 TABLET | Refills: 1 | Status: SHIPPED | OUTPATIENT
Start: 2025-01-20

## 2025-03-04 ENCOUNTER — OFFICE VISIT (OUTPATIENT)
Dept: INTERNAL MEDICINE | Facility: CLINIC | Age: 75
End: 2025-03-04
Payer: MEDICARE

## 2025-03-04 VITALS
DIASTOLIC BLOOD PRESSURE: 62 MMHG | SYSTOLIC BLOOD PRESSURE: 120 MMHG | HEIGHT: 66 IN | OXYGEN SATURATION: 97 % | BODY MASS INDEX: 25.13 KG/M2 | WEIGHT: 156.4 LBS | HEART RATE: 67 BPM

## 2025-03-04 DIAGNOSIS — E03.9 ACQUIRED HYPOTHYROIDISM: Chronic | ICD-10-CM

## 2025-03-04 DIAGNOSIS — I10 ESSENTIAL HYPERTENSION: Chronic | ICD-10-CM

## 2025-03-04 DIAGNOSIS — R73.09 ELEVATED GLUCOSE: ICD-10-CM

## 2025-03-04 DIAGNOSIS — J30.2 SEASONAL ALLERGIC RHINITIS, UNSPECIFIED TRIGGER: Chronic | ICD-10-CM

## 2025-03-04 DIAGNOSIS — Z00.00 MEDICARE ANNUAL WELLNESS VISIT, SUBSEQUENT: Primary | ICD-10-CM

## 2025-03-04 DIAGNOSIS — F32.9 REACTIVE DEPRESSION: Chronic | ICD-10-CM

## 2025-03-04 DIAGNOSIS — Z12.11 SCREENING FOR COLON CANCER: ICD-10-CM

## 2025-03-04 PROBLEM — Z78.9 STATIN INTOLERANCE: Status: ACTIVE | Noted: 2025-03-04

## 2025-03-04 NOTE — PROGRESS NOTES
Subjective   The ABCs of the Annual Wellness Visit  Medicare Wellness Visit      Tammy Torres is a 74 y.o. patient who presents for a Medicare Wellness Visit.    The following portions of the patient's history were reviewed and   updated as appropriate: allergies, current medications, past family history, past medical history, past social history, past surgical history, and problem list.    Compared to one year ago, the patient's physical   health is the same.  Compared to one year ago, the patient's mental   health is the same.    Recent Hospitalizations:  She was not admitted to the hospital during the last year.     Current Medical Providers:  Patient Care Team:  Hina Rosen APRN as PCP - General (Internal Medicine)    Outpatient Medications Prior to Visit   Medication Sig Dispense Refill    BIOTIN PO Take  by mouth.      Calcium 600-10 MG-MCG chewable tablet Chew 600 mg Daily.      Cholecalciferol (Vitamin D) 50 MCG (2000 UT) capsule Take 1 capsule by mouth Daily.      COLLAGEN PO Take  by mouth.      levothyroxine (SYNTHROID, LEVOTHROID) 75 MCG tablet Take 1 tablet by mouth Daily. 90 tablet 1    lisinopril (PRINIVIL,ZESTRIL) 5 MG tablet TAKE 1 TABLET BY MOUTH DAILY 90 tablet 1    QUEtiapine (SEROquel) 25 MG tablet TAKE 1 TABLET BY MOUTH AT NIGHT AS NEEDED FOR INSOMNIA 90 tablet 0    Saccharomyces boulardii (Probiotic) 250 MG capsule Take 250 mg by mouth Daily.      TURMERIC PO Take  by mouth.      Unable to find 1 each 1 (One) Time. VITAMIN C 500MG 2X DAILY      vitamin B-12 (CYANOCOBALAMIN) 100 MCG tablet Take 0.5 tablets by mouth Daily.      sertraline (ZOLOFT) 50 MG tablet TAKE 1 AND 1/2 TABLETS BY MOUTH DAILY 45 tablet 1     No facility-administered medications prior to visit.     No opioid medication identified on active medication list. I have reviewed chart for other potential  high risk medication/s and harmful drug interactions in the elderly.      Aspirin is not on active medication list.   "Aspirin use is not indicated based on review of current medical condition/s. Risk of harm outweighs potential benefits.  .    Patient Active Problem List   Diagnosis    Depression    Essential hypertension    Acquired hypothyroidism    Insomnia    Rosacea    Seasonal allergic rhinitis    Statin intolerance    Elevated glucose     Advance Care Planning Advance Directive is not on file.  ACP discussion was held with the patient during this visit. Patient has an advance directive (not in EMR), copy requested.            Objective   Vitals:    03/04/25 1252   BP: 120/62   BP Location: Left arm   Patient Position: Sitting   Cuff Size: Adult   Pulse: 67   SpO2: 97%   Weight: 70.9 kg (156 lb 6.4 oz)   Height: 167.6 cm (66\")   PainSc: 0-No pain       Estimated body mass index is 25.24 kg/m² as calculated from the following:    Height as of this encounter: 167.6 cm (66\").    Weight as of this encounter: 70.9 kg (156 lb 6.4 oz).    BMI is >= 25 and <30. (Overweight) The following options were offered after discussion;: exercise counseling/recommendations and nutrition counseling/recommendations           Does the patient have evidence of cognitive impairment? No  Lab Results   Component Value Date    HGBA1C 5.90 (H) 03/04/2025                                                                                                Health  Risk Assessment    Smoking Status:  Social History     Tobacco Use   Smoking Status Never   Smokeless Tobacco Never     Alcohol Consumption:  Social History     Substance and Sexual Activity   Alcohol Use Yes    Comment: Wine or drink once a month or less       Fall Risk Screen  STEADI Fall Risk Assessment was completed, and patient is at LOW risk for falls.Assessment completed on:3/4/2025    Depression Screening   Little interest or pleasure in doing things? Not at all   Feeling down, depressed, or hopeless? Several days   PHQ-2 Total Score 1      Health Habits and Functional and Cognitive " Screening:      3/4/2025    12:53 PM   Functional & Cognitive Status   Do you have difficulty preparing food and eating? No   Do you have difficulty bathing yourself, getting dressed or grooming yourself? No   Do you have difficulty using the toilet? No   Do you have difficulty moving around from place to place? No   Do you have trouble with steps or getting out of a bed or a chair? No   Current Diet Limited Junk Food   Dental Exam Not up to date   Eye Exam Up to date   Exercise (times per week) 3 times per week   Current Exercises Include Aerobics;House Cleaning;Walking;Light Weights;Cardiovascular Workout   Do you need help using the phone?  No   Are you deaf or do you have serious difficulty hearing?  No   Do you need help to go to places out of walking distance? No   Do you need help shopping? No   Do you need help preparing meals?  No   Do you need help with housework?  No   Do you need help with laundry? No   Do you need help taking your medications? No   Do you need help managing money? No   Do you ever drive or ride in a car without wearing a seat belt? No   Have you felt unusual stress, anger or loneliness in the last month? Yes   Who do you live with? Spouse   If you need help, do you have trouble finding someone available to you? No   Have you been bothered in the last four weeks by sexual problems? No   Do you have difficulty concentrating, remembering or making decisions? No           Age-appropriate Screening Schedule:  Refer to the list below for future screening recommendations based on patient's age, sex and/or medical conditions. Orders for these recommended tests are listed in the plan section. The patient has been provided with a written plan.    Health Maintenance List  Health Maintenance   Topic Date Due    TDAP/TD VACCINES (1 - Tdap) Never done    BMI FOLLOWUP  05/29/2019    ANNUAL WELLNESS VISIT  12/27/2024    COLORECTAL CANCER SCREENING  05/18/2025    ZOSTER VACCINE (1 of 2) 11/22/2025  (Originally 7/5/2000)    COVID-19 Vaccine (5 - 2024-25 season) 03/03/2026 (Originally 9/1/2024)    Pneumococcal Vaccine 50+ (1 of 1 - PCV) 11/22/2026 (Originally 7/5/2000)    LIPID PANEL  09/17/2025    DXA SCAN  07/20/2026    MAMMOGRAM  08/13/2026    HEPATITIS C SCREENING  Completed    INFLUENZA VACCINE  Completed    PAP SMEAR  Discontinued                                                                                                                                                CMS Preventative Services Quick Reference  Risk Factors Identified During Encounter  Immunizations Discussed/Encouraged: Tdap  Colon cancer screening    The above risks/problems have been discussed with the patient.  Pertinent information has been shared with the patient in the After Visit Summary.  An After Visit Summary and PPPS were made available to the patient.    Follow Up:   Next Medicare Wellness visit to be scheduled in 1 year.         Additional E&M Note during same encounter follows:  Patient has additional, significant, and separately identifiable condition(s)/problem(s) that require work above and beyond the Medicare Wellness Visit     Chief Complaint  Medicare Wellness-subsequent    Subjective    HPI  Tammy is also being seen today for additional medical problem/s.       The patient is a 74-year-old female who presents for evaluation of depression, elevated cholesterol, and health maintenance.    She has been experiencing symptoms of depression for approximately 1.5 months. She reports a lack of efficacy from her current medication, Zoloft 50 mg, which she has been taking for an extended period. Approximately 3 to 3.5 weeks ago, she increased her dosage to 100 mg but has not observed any significant improvement. She identifies potential triggers for her depressive symptoms, including the Alzheimer's diagnosis of a close friend and the associated stressors. She also mentions the impact of seasonal weather changes on her mood.  "She does not experience anxiety or nervousness but reports difficulty in initiating activities, particularly in the mornings. She has a history of using Prozac, which she found beneficial, but this was over 20 years ago. She has a lifelong history of depression with periods of remission and exacerbation.    She has a history of elevated cholesterol levels, which have been refractory to treatment with several different medications due to intolerance. She reports experiencing severe pain with 3 different cholesterol medications, leading to discontinuation. She admits to a suboptimal diet, characterized by excessive consumption of junk food.    She has a history of measles and has received all recommended COVID-19 vaccines up to this point. She contracted COVID-19 in January 2023, which presented with gastrointestinal symptoms. She is due for a ColoGuard test in May 2025 (agrees to colonoscopy).    Review of Systems   HENT:  Positive for congestion and postnasal drip.    Respiratory:  Negative for cough, chest tightness and shortness of breath.    Cardiovascular:  Negative for chest pain, palpitations and leg swelling.   Gastrointestinal:  Negative for abdominal pain.   Musculoskeletal:  Positive for arthralgias.   Psychiatric/Behavioral:  Positive for dysphoric mood. The patient is nervous/anxious.          BMI is >= 25 and <30. (Overweight) The following options were offered after discussion;: exercise counseling/recommendations and nutrition counseling/recommendations       Objective   Vital Signs:  /62 (BP Location: Left arm, Patient Position: Sitting, Cuff Size: Adult)   Pulse 67   Ht 167.6 cm (66\")   Wt 70.9 kg (156 lb 6.4 oz)   SpO2 97%   BMI 25.24 kg/m²   Physical Exam  Constitutional:       Appearance: She is well-developed. She is not ill-appearing.   HENT:      Head: Normocephalic.      Right Ear: Hearing, tympanic membrane and external ear normal.      Left Ear: Hearing, tympanic membrane and " external ear normal.      Nose: Nose normal. No nasal deformity, mucosal edema or rhinorrhea.      Right Sinus: No maxillary sinus tenderness or frontal sinus tenderness.      Left Sinus: No maxillary sinus tenderness or frontal sinus tenderness.      Mouth/Throat:      Dentition: Normal dentition.      Pharynx: Postnasal drip present.   Eyes:      General: Lids are normal.         Right eye: No discharge.         Left eye: No discharge.      Conjunctiva/sclera: Conjunctivae normal.      Right eye: No exudate.     Left eye: No exudate.  Neck:      Thyroid: No thyroid mass or thyromegaly.      Vascular: No carotid bruit.      Trachea: Trachea normal.   Cardiovascular:      Rate and Rhythm: Regular rhythm.      Pulses: Normal pulses.      Heart sounds: Normal heart sounds. No murmur heard.  Pulmonary:      Effort: No respiratory distress.      Breath sounds: Normal breath sounds. No decreased breath sounds, wheezing, rhonchi or rales.   Abdominal:      General: Bowel sounds are normal.      Palpations: Abdomen is soft.      Tenderness: There is no abdominal tenderness.   Musculoskeletal:      Cervical back: Normal range of motion. No edema.   Lymphadenopathy:      Head:      Right side of head: No submental, submandibular, tonsillar, preauricular, posterior auricular or occipital adenopathy.      Left side of head: No submental, submandibular, tonsillar, preauricular, posterior auricular or occipital adenopathy.   Skin:     General: Skin is warm and dry.      Nails: There is no clubbing.   Neurological:      Mental Status: She is alert.   Psychiatric:         Behavior: Behavior is cooperative.               The following data was reviewed by: MARILOU Alston on 03/04/2025:    Common labs          9/17/2024    14:58 3/4/2025    14:10   Common Labs   Glucose 127  117    BUN 10  11    Creatinine 0.90  0.87    Sodium 135  137    Potassium 5.0  4.4    Chloride 99  100    Calcium 9.5  9.7    Albumin 4.3  4.1    Total  Bilirubin 0.3  0.3    Alkaline Phosphatase 83  82    AST (SGOT) 18  16    ALT (SGPT) 11  12    WBC 5.43  6.69    Hemoglobin 13.7  14.6    Hematocrit 42.7  44.9    Platelets 334  329    Total Cholesterol 230     Triglycerides 154     HDL Cholesterol 54     LDL Cholesterol  148     Hemoglobin A1C  5.90        Results  Laboratory Studies  TSH was 1. Vitamin D level was good. Cholesterol was elevated. Kidney function was good. Liver enzymes were good. Blood glucose was slightly elevated.              Assessment and Plan Additional age appropriate preventative wellness advice topics were discussed during today's preventative wellness exam(some topics already addressed during AWV portion of the note above):   Nutrition: Discussed nutrition plan with patient. Information shared in after visit summary. Goal is for a well balanced diet to enhance overall health.              Diagnoses and all orders for this visit:    1. Medicare annual wellness visit, subsequent (Primary)    2. Reactive depression  Assessment & Plan:  She has been experiencing depression for about a month and a half. Despite increasing her Zoloft (sertraline) dose to 100 mg daily 3-1/2 weeks ago, there has been no significant improvement. She will be transitioned to Prozac (fluoxetine), starting with 20 mg daily for one week, then increasing to 40 mg daily. She will continue taking Zoloft 50 mg daily for a few days until Prozac is established in her system. The prescription for Prozac will be sent to [facility].    Orders:  -     FLUoxetine (PROzac) 20 MG capsule; 1 tablet daily x 7 days then 2 tablets daily  Dispense: 60 capsule; Refill: 1    3. Acquired hypothyroidism  Assessment & Plan:  She is managed on Synthroid 75 mcg daily for replacement-recheck TSH.    Orders:  -     TSH    4. Essential hypertension  Assessment & Plan:  BP is stable on lisinopril which she will continue along with a low sodium diet.    Orders:  -     CBC (No Diff)  -      Comprehensive Metabolic Panel    5. Seasonal allergic rhinitis, unspecified trigger  Assessment & Plan:  She has postnasal drainage on exam today, advised to Claritin as needed for mgmt of sx. RTC if sx worsen.      6. Elevated glucose  Assessment & Plan:  Glucose has been mildly elevated with previous labs, recheck along with A1c.    Orders:  -     Hemoglobin A1c    7. Screening for colon cancer  -     Ambulatory Referral to Gastroenterology             Follow Up   Return in about 2 months (around 5/4/2025).  Patient was given instructions and counseling regarding her condition or for health maintenance advice. Please see specific information pulled into the AVS if appropriate.  Patient or patient representative verbalized consent for the use of Ambient Listening during the visit with  MARILOU Alston for chart documentation. 3/16/2025  07:09 EDT

## 2025-03-05 LAB
ALBUMIN SERPL-MCNC: 4.1 G/DL (ref 3.5–5.2)
ALBUMIN/GLOB SERPL: 1.4 G/DL
ALP SERPL-CCNC: 82 U/L (ref 39–117)
ALT SERPL-CCNC: 12 U/L (ref 1–33)
AST SERPL-CCNC: 16 U/L (ref 1–32)
BILIRUB SERPL-MCNC: 0.3 MG/DL (ref 0–1.2)
BUN SERPL-MCNC: 11 MG/DL (ref 8–23)
BUN/CREAT SERPL: 12.6 (ref 7–25)
CALCIUM SERPL-MCNC: 9.7 MG/DL (ref 8.6–10.5)
CHLORIDE SERPL-SCNC: 100 MMOL/L (ref 98–107)
CO2 SERPL-SCNC: 26.8 MMOL/L (ref 22–29)
CREAT SERPL-MCNC: 0.87 MG/DL (ref 0.57–1)
EGFRCR SERPLBLD CKD-EPI 2021: 70 ML/MIN/1.73
ERYTHROCYTE [DISTWIDTH] IN BLOOD BY AUTOMATED COUNT: 13.9 % (ref 12.3–15.4)
GLOBULIN SER CALC-MCNC: 2.9 GM/DL
GLUCOSE SERPL-MCNC: 117 MG/DL (ref 65–99)
HBA1C MFR BLD: 5.9 % (ref 4.8–5.6)
HCT VFR BLD AUTO: 44.9 % (ref 34–46.6)
HGB BLD-MCNC: 14.6 G/DL (ref 12–15.9)
MCH RBC QN AUTO: 29.4 PG (ref 26.6–33)
MCHC RBC AUTO-ENTMCNC: 32.5 G/DL (ref 31.5–35.7)
MCV RBC AUTO: 90.5 FL (ref 79–97)
PLATELET # BLD AUTO: 329 10*3/MM3 (ref 140–450)
POTASSIUM SERPL-SCNC: 4.4 MMOL/L (ref 3.5–5.2)
PROT SERPL-MCNC: 7 G/DL (ref 6–8.5)
RBC # BLD AUTO: 4.96 10*6/MM3 (ref 3.77–5.28)
SODIUM SERPL-SCNC: 137 MMOL/L (ref 136–145)
TSH SERPL DL<=0.005 MIU/L-ACNC: 2.63 UIU/ML (ref 0.27–4.2)
WBC # BLD AUTO: 6.69 10*3/MM3 (ref 3.4–10.8)

## 2025-03-16 PROBLEM — R73.09 ELEVATED GLUCOSE: Status: ACTIVE | Noted: 2025-03-16

## 2025-03-16 NOTE — ASSESSMENT & PLAN NOTE
She has postnasal drainage on exam today, advised to Claritin as needed for mgmt of sx. RTC if sx worsen.

## 2025-03-16 NOTE — ASSESSMENT & PLAN NOTE
She has been experiencing depression for about a month and a half. Despite increasing her Zoloft (sertraline) dose to 100 mg daily 3-1/2 weeks ago, there has been no significant improvement. She will be transitioned to Prozac (fluoxetine), starting with 20 mg daily for one week, then increasing to 40 mg daily. She will continue taking Zoloft 50 mg daily for a few days until Prozac is established in her system. The prescription for Prozac will be sent to [facility].

## 2025-03-16 NOTE — PATIENT INSTRUCTIONS
Medicare Wellness  Personal Prevention Plan of Service     Date of Office Visit:    Encounter Provider:  MARILOU Alston  Place of Service:  [facility]  Patient Name: Tammy Torres  :  1950    As part of the Medicare Wellness portion of your visit today, we are providing you with this personalized preventive plan of services (PPPS). This plan is based upon recommendations of the [place] Preventive Services Task Force (USPSTF) and the Advisory Committee on Immunization Practices (ACIP).    This lists the preventive care services that should be considered, and provides dates of when you are due. Items listed as completed are up-to-date and do not require any further intervention.    Health Maintenance   Topic Date Due    TDAP/TD VACCINES (1 - Tdap) Never done    BMI FOLLOWUP  2019    ANNUAL WELLNESS VISIT  2024    COLORECTAL CANCER SCREENING  2025    ZOSTER VACCINE (1 of 2) 2025 (Originally 2000)    COVID-19 Vaccine ( - -25 season) 2026 (Originally 2024)    Pneumococcal Vaccine 50+ (1 of 1 - PCV) 2026 (Originally 2000)    LIPID PANEL  2025    DXA SCAN  2026    MAMMOGRAM  2026    HEPATITIS C SCREENING  Completed    INFLUENZA VACCINE  Completed    PAP SMEAR  Discontinued       Orders Placed This Encounter   Procedures    Hemoglobin A1c     Release to patient:   Routine Release [[id number]]     [facility] Has the patient fasted?:   No    CBC (No Diff)     Release to patient:   Routine Release [[id number]]     [facility] Has the patient fasted?:   No    Comprehensive Metabolic Panel     Release to patient:   Routine Release [[id number]]     [facility] Has the patient fasted?:   No    TSH     Release to patient:   Routine Release [[id number]]     [facility] Has the patient fasted?:   No    Ambulatory Referral to Gastroenterology     Referral Priority:   Routine     Referral Type:   Consultation     Referral  Reason:   Specialty Services Required     Referred to Provider:   Tamara Pearce DO     Requested Specialty:   Gastroenterology     Number of Visits Requested:   1       Return in about 2 months (around 5/4/2025).

## 2025-04-05 DIAGNOSIS — G47.00 INSOMNIA, UNSPECIFIED TYPE: ICD-10-CM

## 2025-04-06 DIAGNOSIS — F41.0 PANIC ATTACKS: Primary | ICD-10-CM

## 2025-04-06 RX ORDER — LORAZEPAM 0.5 MG/1
0.5 TABLET ORAL EVERY 8 HOURS PRN
Qty: 15 TABLET | Refills: 0 | Status: SHIPPED | OUTPATIENT
Start: 2025-04-06
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
## 2025-04-07 ENCOUNTER — PRIOR AUTHORIZATION (OUTPATIENT)
Dept: INTERNAL MEDICINE | Facility: CLINIC | Age: 75
End: 2025-04-07
Payer: MEDICARE

## 2025-04-07 RX ORDER — QUETIAPINE FUMARATE 25 MG/1
TABLET, FILM COATED ORAL
Qty: 90 TABLET | Refills: 1 | Status: SHIPPED | OUTPATIENT
Start: 2025-04-07

## 2025-04-09 ENCOUNTER — PATIENT MESSAGE (OUTPATIENT)
Dept: INTERNAL MEDICINE | Facility: CLINIC | Age: 75
End: 2025-04-09
Payer: MEDICARE

## 2025-05-02 DIAGNOSIS — F32.9 REACTIVE DEPRESSION: Chronic | ICD-10-CM

## 2025-05-15 DIAGNOSIS — E03.9 HYPOTHYROIDISM, UNSPECIFIED TYPE: ICD-10-CM

## 2025-05-15 RX ORDER — LEVOTHYROXINE SODIUM 75 UG/1
75 TABLET ORAL DAILY
Qty: 90 TABLET | Refills: 1 | Status: SHIPPED | OUTPATIENT
Start: 2025-05-15

## 2025-07-01 ENCOUNTER — PATIENT MESSAGE (OUTPATIENT)
Dept: INTERNAL MEDICINE | Facility: CLINIC | Age: 75
End: 2025-07-01
Payer: MEDICARE

## 2025-07-01 DIAGNOSIS — F32.9 REACTIVE DEPRESSION: Chronic | ICD-10-CM

## 2025-07-01 RX ORDER — FLUOXETINE HYDROCHLORIDE 40 MG/1
40 CAPSULE ORAL DAILY
Qty: 90 CAPSULE | Refills: 0 | Status: SHIPPED | OUTPATIENT
Start: 2025-07-01

## 2025-07-14 ENCOUNTER — OFFICE VISIT (OUTPATIENT)
Dept: INTERNAL MEDICINE | Facility: CLINIC | Age: 75
End: 2025-07-14
Payer: MEDICARE

## 2025-07-14 VITALS
BODY MASS INDEX: 25.1 KG/M2 | SYSTOLIC BLOOD PRESSURE: 130 MMHG | DIASTOLIC BLOOD PRESSURE: 72 MMHG | HEART RATE: 66 BPM | HEIGHT: 66 IN | OXYGEN SATURATION: 96 % | WEIGHT: 156.2 LBS

## 2025-07-14 DIAGNOSIS — R73.09 ELEVATED GLUCOSE: ICD-10-CM

## 2025-07-14 DIAGNOSIS — I10 ESSENTIAL HYPERTENSION: Primary | Chronic | ICD-10-CM

## 2025-07-14 DIAGNOSIS — E03.9 ACQUIRED HYPOTHYROIDISM: Chronic | ICD-10-CM

## 2025-07-14 DIAGNOSIS — F32.9 REACTIVE DEPRESSION: Chronic | ICD-10-CM

## 2025-07-14 RX ORDER — FLUOXETINE HYDROCHLORIDE 40 MG/1
40 CAPSULE ORAL DAILY
Start: 2025-07-14

## 2025-07-15 DIAGNOSIS — G47.00 INSOMNIA, UNSPECIFIED TYPE: ICD-10-CM

## 2025-07-15 RX ORDER — QUETIAPINE FUMARATE 25 MG/1
TABLET, FILM COATED ORAL
Qty: 90 TABLET | Refills: 1 | Status: SHIPPED | OUTPATIENT
Start: 2025-07-15

## 2025-07-19 DIAGNOSIS — I10 ESSENTIAL HYPERTENSION: ICD-10-CM
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
## 2025-07-19 NOTE — ASSESSMENT & PLAN NOTE
Blood glucose slightly elevated but not diabetic. Advise reducing carbohydrate intake and monitoring diet.

## 2025-07-19 NOTE — ASSESSMENT & PLAN NOTE
Fluoxetine increased from 20 mg to 40 mg in April due to increased anxiety, doing well on higher dose which she will continue.

## 2025-07-21 RX ORDER — LISINOPRIL 5 MG/1
5 TABLET ORAL DAILY
Qty: 90 TABLET | Refills: 1 | Status: SHIPPED | OUTPATIENT
Start: 2025-07-21

## 2025-08-21 DIAGNOSIS — E03.9 HYPOTHYROIDISM, UNSPECIFIED TYPE: ICD-10-CM

## 2025-08-21 DIAGNOSIS — F32.9 REACTIVE DEPRESSION: Chronic | ICD-10-CM

## 2025-08-21 RX ORDER — LEVOTHYROXINE SODIUM 75 UG/1
75 TABLET ORAL DAILY
Qty: 90 TABLET | Refills: 0 | Status: SHIPPED | OUTPATIENT
Start: 2025-08-21

## 2025-08-21 RX ORDER — FLUOXETINE HYDROCHLORIDE 40 MG/1
CAPSULE ORAL
Qty: 90 CAPSULE | Refills: 0 | Status: SHIPPED | OUTPATIENT
Start: 2025-08-21